# Patient Record
Sex: FEMALE | Race: WHITE | NOT HISPANIC OR LATINO | Employment: FULL TIME | URBAN - METROPOLITAN AREA
[De-identification: names, ages, dates, MRNs, and addresses within clinical notes are randomized per-mention and may not be internally consistent; named-entity substitution may affect disease eponyms.]

---

## 2018-02-28 ENCOUNTER — TRANSFERRED RECORDS (OUTPATIENT)
Dept: MULTI SPECIALTY CLINIC | Facility: CLINIC | Age: 53
End: 2018-02-28

## 2018-02-28 LAB
HPV ABSTRACT: NORMAL
MAMMOGRAM: NORMAL
PAP-ABSTRACT: NORMAL

## 2020-05-05 ENCOUNTER — OFFICE VISIT (OUTPATIENT)
Dept: ORTHOPEDICS | Facility: CLINIC | Age: 55
End: 2020-05-05
Payer: COMMERCIAL

## 2020-05-05 ENCOUNTER — ANCILLARY PROCEDURE (OUTPATIENT)
Dept: GENERAL RADIOLOGY | Facility: CLINIC | Age: 55
End: 2020-05-05
Attending: PEDIATRICS
Payer: COMMERCIAL

## 2020-05-05 ENCOUNTER — TELEPHONE (OUTPATIENT)
Dept: ORTHOPEDICS | Facility: CLINIC | Age: 55
End: 2020-05-05

## 2020-05-05 VITALS
DIASTOLIC BLOOD PRESSURE: 70 MMHG | BODY MASS INDEX: 22.66 KG/M2 | SYSTOLIC BLOOD PRESSURE: 120 MMHG | WEIGHT: 136 LBS | HEIGHT: 65 IN

## 2020-05-05 DIAGNOSIS — S69.92XA INJURY OF LEFT WRIST, INITIAL ENCOUNTER: ICD-10-CM

## 2020-05-05 DIAGNOSIS — S69.92XA INJURY OF LEFT WRIST, INITIAL ENCOUNTER: Primary | ICD-10-CM

## 2020-05-05 PROCEDURE — 99204 OFFICE O/P NEW MOD 45 MIN: CPT | Performed by: PEDIATRICS

## 2020-05-05 PROCEDURE — 73110 X-RAY EXAM OF WRIST: CPT | Mod: LT

## 2020-05-05 ASSESSMENT — MIFFLIN-ST. JEOR: SCORE: 1207.77

## 2020-05-05 NOTE — PATIENT INSTRUCTIONS
Icing, elevation, rest as needed for comfort  Ok to use compression for comfort  Consider use of wrist brace for additional support for comfort or with activities  MRI left wrist next  Will call with MRI results       Advanced imaging is done by appointment. Some insurance require a prior authorization to be completed which may delay the time until you are able to schedule your appointment. Please call [place] [place] [facility] [place]: [phone] to schedule your MRI.  Depending on your availability you can usually schedule within the next 1-2 days.    The clinic will call you with results, if you have not heard from the clinic within 3-4 days following your MRI please contact us at the number listed below.   If you have any further questions for your physician or physician s care team you can call [phone] and use option 3 to leave a voice message. Calls received during business hours will be returned same day.

## 2020-05-05 NOTE — LETTER
5/5/2020         RE: Maida Menendez  [address] [place] [address]        Dear Colleague,    Thank you for referring your patient, Maida Menendez, to the [facility]. Please see a copy of my visit note below.    Sports Medicine Clinic Visit    PCP: Radha Ref-Primary, Physician    Maida Menendez is a 54 year old female who is seen  as a self referral presenting with a left wrist and hand injury.  A metal picture frame fell and hit her on the top of her hand 2-3 weeks ago.  She has continued to have pain with weight bearing, flexion as well as making a full fist.    She is right hand dominant.      Injury: crush injury . Impact over dorsal carpal area.  **  Limited ability to bear weight with wrist in extension. Also has continued limited extension actively.  Dorsal wrist swelling. Has also noted some warmth there.  Still icing, using topical cream.  Has tried some compression support.    Location of Pain: left wrist/hand   Duration of Pain: 2-3 week(s); thinks at least 3 weeks.  Rating of Pain at worst: 9/10  Rating of Pain Currently: 4/10  Symptoms are better with: Ice and Other medications: topical analgesic   Symptoms are worse with: use   Additional Features:   Positive: swelling, bruising and weakness   Negative: popping, grinding, catching, locking, instability, paresthesias, numbness, pain in other joints and systemic symptoms  Other evaluation and/or treatments so far consists of: Ice and bracing   Prior History of related problems: denies     Social History: work in sales for [employer]     Review of Systems  Musculoskeletal: as above  Remainder of review of systems is negative including constitutional, CV, pulmonary, GI, Skin and Neurologic except as noted in HPI or medical history.      PMHx: right frozen shoulder  PSHx: noncontributory for left wrist  Fam hx: noncontributory    Social History     Socioeconomic History     Marital status:      Spouse name: Not  "on file     Number of children: Not on file     Years of education: Not on file     Highest education level: Not on file   Occupational History     Not on file   Social Needs     Financial resource strain: Not on file     Food insecurity     Worry: Not on file     Inability: Not on file     Transportation needs     Medical: Not on file     Non-medical: Not on file   Tobacco Use     Smoking status: Never Smoker     Smokeless tobacco: Never Used   Substance and Sexual Activity     Alcohol use: Not on file     Drug use: Not on file     Sexual activity: Not on file   Lifestyle     Physical activity     Days per week: Not on file     Minutes per session: Not on file     Stress: Not on file   Relationships     Social connections     Talks on phone: Not on file     Gets together: Not on file     Attends [employer] service: Not on file     Active member of club or organization: Not on file     Attends meetings of clubs or organizations: Not on file     Relationship status: Not on file     Intimate partner violence     Fear of current or ex partner: Not on file     Emotionally abused: Not on file     Physically abused: Not on file     Forced sexual activity: Not on file   Other Topics Concern     Not on file   Social History Narrative     Not on file       Objective  /70   Ht 1.651 m (5' 5\")   Wt 61.7 kg (136 lb)   BMI 22.63 kg/m      GENERAL APPEARANCE: healthy, alert and no distress   GAIT: NORMAL  SKIN: no suspicious lesions or rashes  NEURO: Normal strength and tone, mentation intact and speech normal  PSYCH:  mentation appears normal and affect normal/bright  HEENT: no scleral icterus  CV: distal perfusion intact  RESP: nonlabored breathing    Exam:  Hand/wrist (left):    Inspection:  No deformity noted.  Mild diffuse soft tissue swelling over carpals, dorsal wrist. No ecchymosis.    Motion:  Forearm pronation full, forearm supination lacking 10-15 deg.  Wrist flexion lacking 10-15 deg  Wrist extension lacking " >20-30 deg actively; passive also limited   Pain with active and passive  Wrist radial deviation mild limitation  Wrist ulnar deviation mild limitation  Digit motion grossly full, some pain with active extension and full flexion    Sensation:  Grossly intact light touch.    Radial pulses normal, +2/4, capillary refill brisk.    Palpation:  Tender dorsal carpals, snuffbox, SL interval, ulnar fovea    Skin:       well perfused       capillary refill brisk    Scaphoid shift with pain    Radiology:  Visualized radiographs of left wrist obtained today, and reviewed the images with the patient.  Impression: first CMC arthrosis. Mild narrowing radiolunate space. Mild widening scapholunate interval. No clear fracture noted.    Recent Results (from the past 744 hour(s))   XR Wrist Left G/E 3 Views    Narrative    LEFT WRIST THREE OR MORE VIEWS   5/5/2020 1:49 PM     HISTORY: Injury of left wrist, initial encounter.    COMPARISON: None.      Impression    IMPRESSION: Advanced first carpometacarpal degenerative changes.  Otherwise unremarkable. No evidence of acute fracture.    KERRY STOVALL MD             Assessment:  1. Injury of left wrist, initial encounter         Plan:  Discussed the assessment with the patient.  Initially sounds like contusion, though she has limited extension at the wrist; consider occult bony injury, possible SL dissociation.  We discussed the following: symptom treatment, activity modification/rest, imaging, rehab and support for the affected area. Following discussion, plan:  Given ongoing issues at wrist, with persistent swelling, limited wrist motion, ongoing pain, plan MRI next.  See patient instructions.  Wrist brace provided for comfort.  Follow up: call with MRI results.  Questions answered. The patient indicates understanding of these issues and agrees with the plan.    Jesse Alberto, DO, CAQ            Patient Instructions   Icing, elevation, rest as needed for comfort  Ok to use  compression for comfort  Consider use of wrist brace for additional support for comfort or with activities  MRI left wrist next  Will call with MRI results       Advanced imaging is done by appointment. Some insurance require a prior authorization to be completed which may delay the time until you are able to schedule your appointment. Please call [place] [place] [facility] [place]: [phone] to schedule your MRI.  Depending on your availability you can usually schedule within the next 1-2 days.    The clinic will call you with results, if you have not heard from the clinic within 3-4 days following your MRI please contact us at the number listed below.   If you have any further questions for your physician or physician s care team you can call [phone] and use option 3 to leave a voice message. Calls received during business hours will be returned same day.              Disclaimer: This note consists of symbols derived from keyboarding, dictation and/or voice recognition software. As a result, there may be errors in the script that have gone undetected. Please consider this when interpreting information found in this chart.          Again, thank you for allowing me to participate in the care of your patient.        Sincerely,        Jesse Alberto, DO

## 2020-05-05 NOTE — PROGRESS NOTES
Sports Medicine Clinic Visit    PCP: No Ref-Primary, Physician    Maida Menendez is a 54 year old female who is seen  as a self referral presenting with a left wrist and hand injury.  A metal picture frame fell and hit her on the top of her hand 2-3 weeks ago.  She has continued to have pain with weight bearing, flexion as well as making a full fist.    She is right hand dominant.      Injury: crush injury . Impact over dorsal carpal area.  **  Limited ability to bear weight with wrist in extension. Also has continued limited extension actively.  Dorsal wrist swelling. Has also noted some warmth there.  Still icing, using topical cream.  Has tried some compression support.    Location of Pain: left wrist/hand   Duration of Pain: 2-3 week(s); thinks at least 3 weeks.  Rating of Pain at worst: 9/10  Rating of Pain Currently: 4/10  Symptoms are better with: Ice and Other medications: topical analgesic   Symptoms are worse with: use   Additional Features:   Positive: swelling, bruising and weakness   Negative: popping, grinding, catching, locking, instability, paresthesias, numbness, pain in other joints and systemic symptoms  Other evaluation and/or treatments so far consists of: Ice and bracing   Prior History of related problems: denies     Social History: work in sales for [employer]     Review of Systems  Musculoskeletal: as above  Remainder of review of systems is negative including constitutional, CV, pulmonary, GI, Skin and Neurologic except as noted in HPI or medical history.      PMHx: right frozen shoulder  PSHx: noncontributory for left wrist  Fam hx: noncontributory    Social History     Socioeconomic History     Marital status:      Spouse name: Not on file     Number of children: Not on file     Years of education: Not on file     Highest education level: Not on file   Occupational History     Not on file   Social Needs     Financial resource strain: Not on file     Food insecurity     Worry:  "Not on file     Inability: Not on file     Transportation needs     Medical: Not on file     Non-medical: Not on file   Tobacco Use     Smoking status: Never Smoker     Smokeless tobacco: Never Used   Substance and Sexual Activity     Alcohol use: Not on file     Drug use: Not on file     Sexual activity: Not on file   Lifestyle     Physical activity     Days per week: Not on file     Minutes per session: Not on file     Stress: Not on file   Relationships     Social connections     Talks on phone: Not on file     Gets together: Not on file     Attends [employer] service: Not on file     Active member of club or organization: Not on file     Attends meetings of clubs or organizations: Not on file     Relationship status: Not on file     Intimate partner violence     Fear of current or ex partner: Not on file     Emotionally abused: Not on file     Physically abused: Not on file     Forced sexual activity: Not on file   Other Topics Concern     Not on file   Social History Narrative     Not on file       Objective  /70   Ht 1.651 m (5' 5\")   Wt 61.7 kg (136 lb)   BMI 22.63 kg/m      GENERAL APPEARANCE: healthy, alert and no distress   GAIT: NORMAL  SKIN: no suspicious lesions or rashes  NEURO: Normal strength and tone, mentation intact and speech normal  PSYCH:  mentation appears normal and affect normal/bright  HEENT: no scleral icterus  CV: distal perfusion intact  RESP: nonlabored breathing    Exam:  Hand/wrist (left):    Inspection:  No deformity noted.  Mild diffuse soft tissue swelling over carpals, dorsal wrist. No ecchymosis.    Motion:  Forearm pronation full, forearm supination lacking 10-15 deg.  Wrist flexion lacking 10-15 deg  Wrist extension lacking >20-30 deg actively; passive also limited   Pain with active and passive  Wrist radial deviation mild limitation  Wrist ulnar deviation mild limitation  Digit motion grossly full, some pain with active extension and full " flexion    Sensation:  Grossly intact light touch.    Radial pulses normal, +2/4, capillary refill brisk.    Palpation:  Tender dorsal carpals, snuffbox, SL interval, ulnar fovea    Skin:       well perfused       capillary refill brisk    Scaphoid shift with pain    Radiology:  Visualized radiographs of left wrist obtained today, and reviewed the images with the patient.  Impression: first CMC arthrosis. Mild narrowing radiolunate space. Mild widening scapholunate interval. No clear fracture noted.    Recent Results (from the past 744 hour(s))   XR Wrist Left G/E 3 Views    Narrative    LEFT WRIST THREE OR MORE VIEWS   5/5/2020 1:49 PM     HISTORY: Injury of left wrist, initial encounter.    COMPARISON: None.      Impression    IMPRESSION: Advanced first carpometacarpal degenerative changes.  Otherwise unremarkable. No evidence of acute fracture.    KERRY STOVALL MD             Assessment:  1. Injury of left wrist, initial encounter         Plan:  Discussed the assessment with the patient.  Initially sounds like contusion, though she has limited extension at the wrist; consider occult bony injury, possible SL dissociation.  We discussed the following: symptom treatment, activity modification/rest, imaging, rehab and support for the affected area. Following discussion, plan:  Given ongoing issues at wrist, with persistent swelling, limited wrist motion, ongoing pain, plan MRI next.  See patient instructions.  Wrist brace provided for comfort.  Follow up: call with MRI results.  Questions answered. The patient indicates understanding of these issues and agrees with the plan.    Jesse Alberto DO, CAQ            Patient Instructions   Icing, elevation, rest as needed for comfort  Ok to use compression for comfort  Consider use of wrist brace for additional support for comfort or with activities  MRI left wrist next  Will call with MRI results       Advanced imaging is done by appointment. Some insurance require a  prior authorization to be completed which may delay the time until you are able to schedule your appointment. Please call [place] [place] [facility] [place]: [phone] to schedule your MRI.  Depending on your availability you can usually schedule within the next 1-2 days.    The clinic will call you with results, if you have not heard from the clinic within 3-4 days following your MRI please contact us at the number listed below.   If you have any further questions for your physician or physician s care team you can call [phone] and use option 3 to leave a voice message. Calls received during business hours will be returned same day.              Disclaimer: This note consists of symbols derived from keyboarding, dictation and/or voice recognition software. As a result, there may be errors in the script that have gone undetected. Please consider this when interpreting information found in this chart.

## 2020-05-05 NOTE — NURSING NOTE
Patient was fitted for a left wrist brace.  She was able to demonstrate fit and use.  All questions were answered  Yaritza Olivo MS, ATC

## 2020-05-07 ENCOUNTER — ANCILLARY PROCEDURE (OUTPATIENT)
Dept: MRI IMAGING | Facility: CLINIC | Age: 55
End: 2020-05-07
Attending: PEDIATRICS
Payer: COMMERCIAL

## 2020-05-07 DIAGNOSIS — S69.92XA INJURY OF LEFT WRIST, INITIAL ENCOUNTER: ICD-10-CM

## 2020-05-07 PROCEDURE — 73221 MRI JOINT UPR EXTREM W/O DYE: CPT | Mod: TC

## 2020-05-08 ENCOUNTER — TELEPHONE (OUTPATIENT)
Dept: ORTHOPEDICS | Facility: CLINIC | Age: 55
End: 2020-05-08

## 2020-05-08 DIAGNOSIS — S63.8X2D TEAR OF LEFT SCAPHOLUNATE LIGAMENT, SUBSEQUENT ENCOUNTER: ICD-10-CM

## 2020-05-08 DIAGNOSIS — S69.92XA LEFT WRIST INJURY: Primary | ICD-10-CM

## 2020-05-08 NOTE — TELEPHONE ENCOUNTER
Results for orders placed or performed in visit on 05/07/20   MR Wrist Left w/o Contrast    Narrative    MR left wrist without contrast 5/7/2020 2:23 PM    Techniques: Multiplanar multisequence imaging of the left wrist was  obtained without administration of intra-articular or intravenous  contrast using routing protocol.    History: left wrist injury, initial contusion from heavy object,  continued wrist pain and swelling; evaluate scapholunate interval,  possible occult bony injury; Injury of left wrist, initial encounter    Comparison: 5/5/2020    Findings:    Markers placed at the dorsal aspect of the wrist overlying the lunate.    Triangular Fibrocartilage: Suspected tearing of the distal lamina of  the triangular ligament.    Interosseous Ligaments:    Scapholunate ligament: Apparent full thickness tear of the dorsal  component of the scapholunate interosseous ligament. Heterogeneous  appearance of the volar component and membranous component may  degeneratively torn. No associated widening of the scapholunate  interosseous ligament space.  Lunatotriquetral ligament: Not well visualized.    Bones: Multiple areas of marrow edema including the carpal bones,  distal radius, and bases of metacarpals.    Articulations:  Joint effusion: Small to moderate radiocarpal and mid carpal pleural  effusions with internal debris, likely synovitis.    Tendons:  Flexor tendons: Intact. Small amount of palmar bursal fluid.  Extensor tendons: Trace fluid in the second extensor compartment  tendon, nonspecific. Intact.    Miscellaneous soft tissues: No volar or dorsal ganglion cysts.      Impression    IMPRESSION:  1. Suspect complete tear of dorsal component of scapholunate  interosseous ligament and degenerative tear of membranous and volar  components.   2. Extensive marrow edema, joint effusion with internal debris, likely  synovitis. Constellation of findings are most concerning for  underlying inflammatory arthritis.  Alternatively reflecting multiple  bone contusions though extent seems atypical for contusion.  3. Suspected tearing of the distal lamina of the triangular ligament.    MARYBEL REBOLLAR

## 2020-05-08 NOTE — TELEPHONE ENCOUNTER
Called and spoke with patient.  Relayed results.  She would like to see a hand specialist.  Referral placed.  She will continue with ice and bracing to be seen.    Yaritza Olivo MS ATC

## 2020-05-08 NOTE — TELEPHONE ENCOUNTER
Multiple areas of bone marrow edema, that fit with contusion.  There is also fluid in this area, consistent with inflammation from her injury.  The radiologist indicated potential for inflammatory arthritis, though I think this is unlikely given she did have an injury, and it is subacute.  There is also suspected tearing of the scapholunate ligament, as well as possibly the triangular fibrocartilage.  See report for details.  Options: 1) continue with icing, OTC medication, bracing for support, and monitoring; 2) referral to orthopedic hand surgeon.  Anticipate the inflammation and bone bruising will calm down with time.  However, given scapholunate dissociation suspected, and no previous problems with this wrist, now with limited range of motion, would favor referral through orthopedic  for further evaluation and management.  If #1, then contact clinic with update in another ~2 weeks.  Thanks.  Jesse Alberto DO, MARILYN

## 2020-05-11 ENCOUNTER — TELEPHONE (OUTPATIENT)
Dept: ORTHOPEDICS | Facility: CLINIC | Age: 55
End: 2020-05-11

## 2020-05-11 NOTE — TELEPHONE ENCOUNTER
[facility] Call Center    Phone Message    May a detailed message be left on voicemail: yes     Reason for Call: Other: pt calking because she has a referral to be seen for a Lft wrist injury. Pt would like to be seen in clinic. Please call the pt back to discuss. Thank You.      Action Taken: Message routed to:  [facility] orthopedics    Travel Screening: Not Applicable

## 2020-05-12 ENCOUNTER — ANCILLARY PROCEDURE (OUTPATIENT)
Dept: GENERAL RADIOLOGY | Facility: CLINIC | Age: 55
End: 2020-05-12
Attending: ORTHOPAEDIC SURGERY
Payer: COMMERCIAL

## 2020-05-12 DIAGNOSIS — S63.8X2A LEFT SCAPHOLUNATE LIGAMENT TEAR: ICD-10-CM

## 2020-05-12 DIAGNOSIS — S63.8X2A LEFT SCAPHOLUNATE LIGAMENT TEAR: Primary | ICD-10-CM

## 2020-05-12 PROCEDURE — 73100 X-RAY EXAM OF WRIST: CPT | Mod: 52

## 2020-05-12 NOTE — TELEPHONE ENCOUNTER
Called patient and scheduled a virtual visit tomorrow with Dr. Arrieta. Patient will go to [facility] location today to get a pencil  stress view xray. Patient was agreeable to plan and will call back with any further questions or concerns.

## 2020-05-13 ENCOUNTER — VIRTUAL VISIT (OUTPATIENT)
Dept: ORTHOPEDICS | Facility: CLINIC | Age: 55
End: 2020-05-13
Payer: COMMERCIAL

## 2020-05-13 ENCOUNTER — DOCUMENTATION ONLY (OUTPATIENT)
Dept: CARE COORDINATION | Facility: CLINIC | Age: 55
End: 2020-05-13

## 2020-05-13 DIAGNOSIS — M25.532 LEFT WRIST PAIN: Primary | ICD-10-CM

## 2020-05-13 NOTE — PROGRESS NOTES
"Maida Menendez is a 54 year old female who is being evaluated via a billable video visit.      The patient has been notified of following:     \"This video visit will be conducted via a call between you and your physician/provider. We have found that certain health care needs can be provided without the need for an in-person physical exam.  This service lets us provide the care you need with a video conversation.  If a prescription is necessary we can send it directly to your pharmacy.  If lab work is needed we can place an order for that and you can then stop by our lab to have the test done at a later time.    Video visits are billed at different rates depending on your insurance coverage.  Please reach out to your insurance provider with any questions.    If during the course of the call the physician/provider feels a video visit is not appropriate, you will not be charged for this service.\"    Patient has given verbal consent for Video visit? Yes    How would you like to obtain your AVS? Mail a copy    Patient would like the video invitation sent by: Send to e-mail at: @[employer]    Will anyone else be joining your video visit? No        Video-Visit Details    Type of service:  Video VisitOriginating Location (pt. Location): Home    Distant Location (provider location):  [facility]     Platform used for Video Visit: Jaky Arrieta MD        "

## 2020-05-13 NOTE — PROGRESS NOTES
Date of Service: May 13, 2020    Chief Complaint:   Chief Complaint   Patient presents with     Consult     Left wrist scapholunate ligament tear       History of Present Illness: Maida Menendez is a 54 year old female who works in sales for [employer] presents today for further evaluation of a left wrist injury. The patient sustained the injury perhaps 3-4 weeks ago after a metal picture frame fell and hit the top of her hand while she was at her desk.  Prior to that picture frame falling, she notes she did slam the hand on a desk.  The patient was initially seen by Dr. Alberto who provided a wrist brace and recommended an MRI. She was subsequently referred to me.     She notes that the pain never fully went away after the intial injury. She has also noted pain waking her up at night,  pain holding a phone, putting weight on it or bending it during yoga. Pain is in the dorsal hand and wrist and in the area of the distal ulna.    However it was never really clear if the pain was from sleeping on it funny or something else- she actually forgot about the episode where the frame hit the hand until directly asked about any injury. She wondered if it was arthritis or carpal tunnel.     She has iced, rested, and put on a store bought brace. It has looked swollen and red, puffy dorsally. She notes that the symptosm flare up from time to time. It feels better with rest and ice.     There is no numbness/tingling. There are no difficulties moving the fingers.  No fevers or chills.    Review of Systems: A 14-point review of systems was obtained on intake and reviewed.     Past Medical History:   Diagnosis Date     Allergies      Hyperlipidemia        Past Surgical History:   Procedure Laterality Date     ENDOSCOPIC STRIPPING VEIN(S)  2015, 2017     SINUS SURGERY  2000, 2014         Current Outpatient Medications:      order for DME, Equipment being ordered: wrist brace, left, Disp: 1 Device, Rfl: 0    Allergies   Allergen  Reactions     Doxycycline      Sulfa Drugs        Social History     Tobacco Use     Smoking status: Never Smoker     Smokeless tobacco: Never Used   Substance Use Topics     Alcohol use: Not on file     Drug use: Not on file        Family History   Problem Relation Age of Onset     Coronary Artery Disease Mother      Hyperlipidemia Mother      Cerebrovascular Disease Mother      Hypertension Father      Cerebrovascular Disease Father      Coronary Artery Disease Paternal Grandfather        Physical examination:  Well-developed, well-nourished and in no acute distress.  Alert and oriented to surroundings. Cooperative with exam. Respirations unlabored.     left upper extremity:   There is faint dorsal wrist and hand swelling. AROM WE 30, WF 70. Sore but not painful. Full pronation and supnation. No pain with gentle passive wrist ROM. No erythema or wounds. Can make full composite fist and straighten fingers out fully. Sensation intact in median, radial and ulnar nerve distributions.     Radiographs: 3 views obtained today of the left wrist demonstrate no remarkable findings. Stress  views show SL interval bilaterally upper limit normal    MRI: 1. Suspect complete tear of dorsal component of scapholunate  interosseous ligament and degenerative tear of membranous and volar  components.   2. Extensive marrow edema, joint effusion with internal debris, likely  synovitis. Constellation of findings are most concerning for  underlying inflammatory arthritis. Alternatively reflecting multiple  bone contusions though extent seems atypical for contusion.  3. Suspected tearing of the distal lamina of the triangular ligament.      Assessment: Left wrist pain. MRI with SL ligament tearing, also marrow edema, joint effusion with debris, bony edema.  No static instability on radiographics, pencil  x-rays with bilateral SL interval upper limit of normal.  Overall I am most suspicious for inflammatory etiology.    Plan:    I  had a long discussion with Maida.  Overall, I do not think the mechanism of injury or clinical picture here is consistent with an acute scapholunate ligament injury.  Lack of substantial static or dynamic instability also suggests that if there is one, it would be unlikely to require acute repair.  She does have a mildly elevated capitolunate angle on her MRI sagittal views but there are no other signs of DISI deformity and this may very well be chronic.     At this point I am more concerned for an inflammatory arthritis.  Based on her time course, stable to improving symptoms and minimal pain with passive motion, I think an infectious arthritis is unlikely.  I discussed with her that pseudogout is certainly on my differential.  As she does feel things are getting slightly better, I would like her to continue to wear her wrist brace and begin to take Aleve 2 times a day for the next 2 weeks.  I would like to see her back in clinic in person in 2 weeks.  At that point based on her progress, will discuss whether it would be appropriate proceed with wrist aspiration and possible steroid injection.  She was agreeable to this and will notify me if any substantial worsening in the interim.    Total time: 30 min  End time 10:52

## 2020-05-28 DIAGNOSIS — M25.532 LEFT WRIST PAIN: ICD-10-CM

## 2020-05-28 LAB
CRP SERPL-MCNC: 7 MG/L (ref 0–8)
ERYTHROCYTE [DISTWIDTH] IN BLOOD BY AUTOMATED COUNT: 13.5 % (ref 10–15)
ERYTHROCYTE [SEDIMENTATION RATE] IN BLOOD BY WESTERGREN METHOD: 45 MM/H (ref 0–30)
HCT VFR BLD AUTO: 40.8 % (ref 35–47)
HGB BLD-MCNC: 12.6 G/DL (ref 11.7–15.7)
MCH RBC QN AUTO: 30 PG (ref 26.5–33)
MCHC RBC AUTO-ENTMCNC: 30.9 G/DL (ref 31.5–36.5)
MCV RBC AUTO: 97 FL (ref 78–100)
PLATELET # BLD AUTO: 255 10E9/L (ref 150–450)
RBC # BLD AUTO: 4.2 10E12/L (ref 3.8–5.2)
URATE SERPL-MCNC: 4.7 MG/DL (ref 2.6–6)
WBC # BLD AUTO: 8.9 10E9/L (ref 4–11)

## 2020-05-29 LAB
ANA PAT SER IF-IMP: ABNORMAL
ANA SER QL IF: POSITIVE
ANA TITR SER IF: ABNORMAL {TITER}
RHEUMATOID FACT SER NEPH-ACNC: <7 IU/ML (ref 0–20)

## 2020-05-30 LAB — CCP AB SER IA-ACNC: 1 U/ML

## 2020-06-01 ENCOUNTER — OFFICE VISIT (OUTPATIENT)
Dept: ORTHOPEDICS | Facility: CLINIC | Age: 55
End: 2020-06-01
Payer: COMMERCIAL

## 2020-06-01 DIAGNOSIS — M25.532 LEFT WRIST PAIN: Primary | ICD-10-CM

## 2020-06-01 NOTE — NURSING NOTE
Reason For Visit:   Chief Complaint   Patient presents with     RECHECK     left wrist scapholunate ligament tear      Primary MD: No Ref-Primary, Physician    Age: 54 year old    ?  No    There were no vitals taken for this visit.    Pain Assessment  Patient Currently in Pain: Yes  0-10 Pain Scale: 7  Primary Pain Location: Wrist    QuickDASH Assessment  No flowsheet data found.     Current Outpatient Medications   Medication Sig Dispense Refill     order for DME Equipment being ordered: wrist brace, left 1 Device 0     Allergies   Allergen Reactions     Doxycycline      Sulfa Drugs      Chandni ESTES

## 2020-06-01 NOTE — LETTER
Date:June 5, 2020      Patient was self referred, no letter generated. Do not send.        [facility] Information

## 2020-06-01 NOTE — PROGRESS NOTES
"Date of Service: Jun 1, 2020    Chief Complaint: Follow-up left wrist pain    History of Present Illness: Maida Menendez comes to see me in follow-up today. Since last visit she feels the swelling is much better although she still has some residual swelling over the dorsal hand. The redness has resolved. She still cant extend the wrist or pt weight on it. She feels it locks sometimes and she can't use it until it cracks, and then it feels better. The pain radiates up dorsal hand into fingers. Also some radial wrist pain and pain in the thumb. She has some similar pain in left thumb base. She notes raynaud's syndrome. She was noted to have a \"positive antibody\" many years ago. She was seen by a rheumatologist and given nmedications but they made her sick so she stopped taking them.       Physical examination:  Well-developed, well-nourished and in no acute distress.  Alert and oriented to surroundings. Extra-ocular motions intact. Cooperative with exam. Respirations unlabored.     Examination of the left upper extremity reveals skin to be clean, dry and intact. There are no wounds. Swelling is present over dorsal wrist distal to extensor retinaculum. . Deformity is absent. The patient is able to extend the fingers fully. The patient is able to make a full composite fist with tip to palm distance of (not applicable) finger-widths.     Areas of tenderness are as follows:   diffusely around wrist dorsally most pronounced at SL interval and also thumb CMC joint. Positive thumb CMC grind.       Observed active range of motion is as follows:   Wrist extension: 10 degrees  Wrist flexion: 40  degrees  Pronation: full degrees  Supination: full  degrees    Sensation to light touch is intact as follows:  median, radial and ulnar    Fingers appear well-perfused with good capillary refill    Labs: Positive DANIELLE mildly elevated ESR    Assessment: 54 year old female with left dorsal wrist pain. This started shortly after she slammed " her hand on the desk and a picture fell on the back of her wrist. Imaging has shown SL ligament deficiency as well as findings concerning for possible underlying inflammatory process. I discussed with her that her clinical picture is more consistent with a inflammatory arthritis than with an acute scapholunate ligament injury. Injury mechanism would also be quite atypical for Acute SL injury. 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 DANIELLE was positive, ESR mildly positive.     Plan:   I would like her to be seen by rheumatology to see if an underlying inflammatory condition may be contributing to this. I did suggest a steroid injection today but she was not interested in this. A steroid taper might also be a good option but I would like her to be evaluated by rheumatology first to get their thoughts. I will also have her see hand therapy for splinting and exercise program for her CMC arthritis.   I would like to see the patient back in 6 weeks for re-evaluation.

## 2020-06-01 NOTE — LETTER
"    6/1/2020         RE: Maida Menendez  [id number] [place] [address]        Dear Colleague,    Thank you for referring your patient, Maida Menendez, to the [facility]. Please see a copy of my visit note below.    Date of Service: Jun 1, 2020    Chief Complaint: Follow-up left wrist pain    History of Present Illness: Maida Menendez comes to see me in follow-up today. Since last visit she feels the swelling is much better although she still has some residual swelling over the dorsal hand. The redness has resolved. She still cant extend the wrist or pt weight on it. She feels it locks sometimes and she can't use it until it cracks, and then it feels better. The pain radiates up dorsal hand into fingers. Also some radial wrist pain and pain in the thumb. She has some similar pain in left thumb base. She notes raynaud's syndrome. She was noted to have a \"positive antibody\" many years ago. She was seen by a rheumatologist and given nmedications but they made her sick so she stopped taking them.       Physical examination:  Well-developed, well-nourished and in no acute distress.  Alert and oriented to surroundings. Extra-ocular motions intact. Cooperative with exam. Respirations unlabored.     Examination of the left upper extremity reveals skin to be clean, dry and intact. There are no wounds. Swelling is present over dorsal wrist distal to extensor retinaculum. . Deformity is absent. The patient is able to extend the fingers fully. The patient is able to make a full composite fist with tip to palm distance of (not applicable) finger-widths.     Areas of tenderness are as follows:   diffusely around wrist dorsally most pronounced at SL interval and also thumb CMC joint. Positive thumb CMC grind.       Observed active range of motion is as follows:   Wrist extension: 10 degrees  Wrist flexion: 40  degrees  Pronation: full degrees  Supination: full  degrees    Sensation to light touch is intact as " follows:  median, radial and ulnar    Fingers appear well-perfused with good capillary refill    Labs: Positive DANIELLE mildly elevated ESR    Assessment: 54 year old female with left dorsal wrist pain. This started shortly after she slammed her hand on the desk and a picture fell on the back of her wrist. Imaging has shown SL ligament deficiency as well as findings concerning for possible underlying inflammatory process. I discussed with her that her clinical picture is more consistent with a inflammatory arthritis than with an acute scapholunate ligament injury. Injury mechanism would also be quite atypical for Acute SL injury. 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 DANIELLE was positive, ESR mildly positive.     Plan:   I would like her to be seen by rheumatology to see if an underlying inflammatory condition may be contributing to this. I did suggest a steroid injection today but she was not interested in this. A steroid taper might also be a good option but I would like her to be evaluated by rheumatology first to get their thoughts. I will also have her see hand therapy for splinting and exercise program for her CMC arthritis.   I would like to see the patient back in 6 weeks for re-evaluation.              Again, thank you for allowing me to participate in the care of your patient.        Sincerely,        Jay Arrieta MD

## 2020-06-03 ENCOUNTER — TELEPHONE (OUTPATIENT)
Dept: RHEUMATOLOGY | Facility: CLINIC | Age: 55
End: 2020-06-03

## 2020-06-03 NOTE — TELEPHONE ENCOUNTER
"Patient is referred by Jay Arrieta for elevated inflammatory markers and left wrist pain.     Patient complains of pain, redness and swelling in her hand and stated that she could not put weight on it. Patient describes the pain as radiating up her hand into her fingers. Patient was seen by a Rheumatologist many years ago, but never followed up with care.     Per referring upon exam:    \"Examination of the left upper extremity reveals skin to be clean, dry and intact. There are no wounds. Swelling is present over dorsal wrist distal to extensor retinaculum. . Deformity is absent. The patient is able to extend the fingers fully. The patient is able to make a full composite fist with tip to palm distance of (not applicable) finger-widths.     Areas of tenderness are as follows:   diffusely around wrist dorsally most pronounced at SL interval and also thumb CMC joint. Positive thumb CMC grind. \"    \"I discussed with her that her clinical picture is more consistent with a inflammatory arthritis than with an acute scapholunate ligament injury. Injury mechanism would also be quite atypical for Acute SL injury. 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 DANIELLE was positive, ESR mildly positive. \"    Component      Latest Ref Rng & Units 5/28/2020   DANIELLE interpretation      NEG:Negative Positive (A)   DANIELLE pattern 1       CENTROMERE   DANIELLE titer 1       1:1,280   Uric Acid      2.6 - 6.0 mg/dL 4.7   Rheumatoid Factor      <12 IU/mL <7   Sed Rate      0 - 30 mm/h 45 (H)   Cyclic Citrullinated Peptide Antibody, IgG      <7 U/mL 1   CRP Inflammation      0.0 - 8.0 mg/L 7.0     Sending to clinic coordinators to offer patient an appointment.     Loan Bhatt CMA   6/3/2020 2:18 PM     "

## 2020-06-04 NOTE — TELEPHONE ENCOUNTER
Health Call Center    Phone Message    May a detailed message be left on voicemail: yes     Reason for Call: pt reporting that Dr. Jay Arrieta referring her to Rheumatology. Pt is waiting for a call from our clinic to schedule an appt. Writer can see Loan's notes from 6/3/20. Writer did not know, for sure, which provider to schedule pt with. Please have clinic coordinators for Rheumatology call this pt back to schedule per Loan's note. Pt can be reached by her cell ph#. If for any reason she does not  the call, please call her ph# [phone] (at the cabin) as sometimes the cell phone does not have good coverage at the cabin. Than you.    Action Taken: Message routed to:  [facility]  Rheumatology    Travel Screening: Not Applicable

## 2020-06-05 NOTE — TELEPHONE ENCOUNTER
Pt can be scheduled in fellow's clinic with Dr. Ngo on 6/10/2020 at 8 am for a virtual visit.      Left message requesting pt return call to me directly.    Day Arredondo RN  Rheumatology Clinic

## 2020-06-08 ENCOUNTER — TELEPHONE (OUTPATIENT)
Dept: ORTHOPEDICS | Facility: CLINIC | Age: 55
End: 2020-06-08

## 2020-06-08 NOTE — TELEPHONE ENCOUNTER
Health Call Center    Phone Message    May a detailed message be left on voicemail: yes     Reason for Call: Other: pt calling to speak with someone because she has questions on what all the imaging showed when she had it done. The pt wants to know does it show the left side of her fist that sticks out under the bone. Pt asking due to swelling does the Dr think that the pt could possibly have carpal tunnel. Please call the pt back to discuss. Thank You.      Action Taken: Message routed to:  [facility] orthopedics    Travel Screening: Not Applicable

## 2020-06-08 NOTE — TELEPHONE ENCOUNTER
Pt returned call to clinic     She went to rheumatology first 30-35 years ago and then again 16 years ago, Dr. Hansen at [facility].  She will fill out request for ANJANA, and we will try to get records before appt if there are any.      Has been offered and appt in fellows clinic on 6/10/2020 and she has accepted.    Day Arredondo RN  Rheumatology Clinic

## 2020-06-10 ENCOUNTER — VIRTUAL VISIT (OUTPATIENT)
Dept: RHEUMATOLOGY | Facility: CLINIC | Age: 55
End: 2020-06-10
Attending: INTERNAL MEDICINE
Payer: COMMERCIAL

## 2020-06-10 DIAGNOSIS — M25.532 LEFT WRIST PAIN: ICD-10-CM

## 2020-06-10 DIAGNOSIS — M34.9 SYSTEMIC SCLEROSIS WITH LIMITED CUTANEOUS INVOLVEMENT (H): ICD-10-CM

## 2020-06-10 DIAGNOSIS — M34.1 CREST SYNDROME (H): Primary | ICD-10-CM

## 2020-06-10 RX ORDER — COVID-19 ANTIGEN TEST
220 KIT MISCELLANEOUS 2 TIMES DAILY WITH MEALS
COMMUNITY
End: 2022-02-10

## 2020-06-10 ASSESSMENT — PAIN SCALES - GENERAL: PAINLEVEL: SEVERE PAIN (6)

## 2020-06-10 NOTE — PROGRESS NOTES
Maida Menendez is a 54 year old female who is being evaluated via a billable video visit. Details of video visit documentation can be found at the end of this note.  ==================================================  CC: swelling of hand and wrist    HPI: 55 yo F with ongoing left wrist weakness and pain after picture fell on her hand back in April. She has redness and swelling of dorsal left wrist and ultimately had an MRI in May that showed a full thickness tear of the dorsal component of the scapholunate interosseous ligament and suspected tearing of the distal lamina of the triangular cartilage. MRI was also notable for bone edema per radiologist. She was then seen by hand specialist, Dr. Arrieta, who felt that these tears are not consistent with acute injury and more so long term injury and is concerned about evidence of synovitis noted on imaging.     Maida notes that she has had improvement in pain over time.  At this point she has no pain with most movements and no longer drops items by accident.  However she still cannot put any weight on her dorsiflexed wrist which is something she would like to be able to do to continue yoga. This is the only time she feels pain in her hand. She also notes some improvement in overnight pain and does not have any general morning stiffness.    Maida was followed many many years ago by a rheumatologist for her very positive DANIELLE which has a centromeric pattern.  She does endorse raynaud's and at the time she was started on a blood pressure medication for her ray nodes which caused her to have some side effects and which she discontinued.  She has a strong preference to not take medications unless they are going to cure her or truly mitigate her symptoms and she felt that the risks and benefits of the blood pressure medication at the time were not for her.  She also notes that she has had notable devyn-ungual capillary dropout to the point that she is able to see little red dots  "at her cuticles.  She does not have pain with Raynaud's episodes, and she is never had a digital ulcer.  Raynaud's episodes occur more commonly in her hands, particularly at temperatures less than 55  F and when she is feeling very anxious or stressed, and she is never noticed one happening in her toes.    She has never had oral ulcers, dyspnea, she denies telangiectasias, does not have difficulty swallowing any items, she did crack her jaw many years ago but does not feel that the opening to her mouth feels tight, she denies dry eye, denies dry mouth, has not had increased frequency of cavities, she does not take steroids for any condition, and she has not yet had a steroid injection of her wrist. The skin of her fingertip does not feel tight or smooth and she has no difficulty or increased awareness of her skin when she makes a tight fist. She has no intractable or recurrent skin itching.    ROS: 10 point ROS completed and negative except as noted per HPI    ALLERGIES: doxycycline, sulfa drugs     MEDS: naproxen (short course prescribed for wrist pain), varicose vein stripping, sinus surgery, hysterectomy    PM/SHx: allergies, HLD    FAMHx: father had PMR that cause bilateral hand pain, both parents had cardiovascular disease    PHYSICAL EXAM:  No vitals taken today. Last full vitals from care everywhere:  Blood Pressure 96/63 12/19/2019 1:51 PM CST     Pulse 67 12/19/2019 1:51 PM CST     Temperature 37.1  C (98.7  F) 12/19/2019 1:51 PM CST     Respiratory Rate 12 01/23/2017 10:21 AM CST     Oxygen Saturation 95% 12/19/2019 1:51 PM CST     Inhaled Oxygen Concentration - -     Weight 60.6 kg (133 lb 9.6 oz) 12/19/2019 1:51 PM CST     Height 164.3 cm (5' 4.69\") 12/19/2019 1:51 PM CST     Body Mass Index 22.45 12/19/2019 1:51 PM CST      General: NAD, pleasant  HEENT: face symmetric, no telangiectasias, even facial expression, no obvious restriction of oral aperture  Resp: able to complete long sentences without " dyspnea or pause  Skin: no rashes of face, upper arms, neck, chest  MSK: pain with dorsiflexion of left wrist and limited dorsiflexion to ~75 degrees, full extension of wrist to 180 degrees, no swelling or redness of either wrist visible, supination and pronation intact bilaterally, no loss of valleys between MCPs, able to make full tight fists bilaterally  Psych: congruent mood and affect    MR left wrist without contrast 5/7/2020 2:23 PM  Findings:  Markers placed at the dorsal aspect of the wrist overlying the lunate.    Triangular Fibrocartilage:   Suspected tearing of the distal lamina of the triangular ligament.     Interosseous Ligaments:    Scapholunate ligament: Apparent full thickness tear of the dorsal  component of the scapholunate interosseous ligament. Heterogeneous  appearance of the volar component and membranous component may  degeneratively torn. No associated widening of the scapholunate  interosseous ligament space.  Lunatotriquetral ligament: Not well visualized.     Bones: Multiple areas of marrow edema including the carpal bones, distal radius, and bases of metacarpals.     Articulations:  Joint effusion: Small to moderate radiocarpal and mid carpal pleural effusions with internal debris, likely synovitis.     Tendons:  Flexor tendons: Intact. Small amount of palmar bursal fluid.  Extensor tendons: Trace fluid in the second extensor compartment  tendon, nonspecific. Intact.     Miscellaneous soft tissues: No volar or dorsal ganglion cysts.                                                                   IMPRESSION:  1. Suspect complete tear of dorsal component of scapholunate interosseous ligament and degenerative tear of membranous and volar  components.   2. Extensive marrow edema, joint effusion with internal debris, likely synovitis. Constellation of findings are most concerning for  underlying inflammatory arthritis. Alternatively reflecting multiple bone contusions though extent seems  atypical for contusion.  3. Suspected tearing of the distal lamina of the triangular ligament.    RHEUM RESULTS Latest Ref Rng & Units 5/28/2020   SED RATE 0 - 30 mm/h 45(H)   CRP, INFLAMMATION 0.0 - 8.0 mg/L 7.0   RHEUMATOID FACTOR <12 IU/mL <7   WBC 4.0 - 11.0 10e9/L 8.9   RBC 3.8 - 5.2 10e12/L 4.20   HGB 11.7 - 15.7 g/dL 12.6   HCT 35.0 - 47.0 % 40.8   MCV 78 - 100 fl 97   MCHC 31.5 - 36.5 g/dL 30.9(L)   RDW 10.0 - 15.0 % 13.5    - 450 10e9/L 255     Lab Results   Component Value Date/Time    JEFF Positive (A) 05/28/2020 12:44 PM    ANAP1 CENTROMERE 05/28/2020 12:44 PM    ANAT1 1:1,280 05/28/2020 12:44 PM    RHF <7 05/28/2020 12:44 PM    CCPIGG 1 05/28/2020 12:44 PM     ASSESSMENT:   53 yo F with 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    ## wrist swelling, tendon injury  Her MRI results are concerning but do not correspond to clinical picture-- for example, she denies MCP pain although devyn-MCP bone marrow edema is noted. Stand alone synovitis, particularly unilateral, would be atypical for CREST. However, as the disease can involve tendons and ligaments, it is still possible that following trauma her systemic disease has slowed down or altered her course of healing secondary to trauma-induce inflammation. In addition, I would like to screen her for evidence of overlap syndromes that could more directly explain her synnovitis.    Alternatively, pseudgout can be triggered by trauma and an aspiration closer to her initial presentation to evaluate for CPPD crystals could have been very helpful. At this time she has minimal swelling visible and is unlikely to have much remaining to aspirate. The treatment for this, given focal involvement, would be steroid joint injection.    Overall, Maida has a strong aversion to starting any medications, getting any  "surgeries, or getting any injections that are not necessary. We did discuss that sometimes a steroid injection can be instructive and that a steroid injection prior to hand therapy may improve outcomes with hand therapy. She would prefer to try hand therapy and if her recovery seems to stall would then consider a steroid injection.    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 However, pulmonary artery hypertension can be a late manifestation of the disease and rather insidious at onset. Therapy for scleroderma at this stage is mainly supportive and Maida has no need for supportive therapies as this time. Nonetheless, she will require monitoring long term. We will need a baseline echo, pulmonary function test, and 6MW but, given COVID restrictions, will delay this until next visit in 6 months at which point hopefully our systems are more readily available for in person visits.    PLAN:  1. Referral to hand therapy. If she cannot make appropriate  2. Complete phenotyping workup for systemic sclerosis, rule out overlap syndrome: TRACEY, centromere, RNP, and SCL-70 antibodies  3. RTC in 6 months for echo, PFT, 6MW    Staffed with Dr. Juan José Ngo MD, PhD  Rheumatology Fellow  Pager [phone]  ==================================================  The patient has been notified of following:     \"This video visit will be conducted via a call between you and your physician/provider. We have found that certain health care needs can be provided without the need for an in-person physical exam.  This service lets us provide the care you need with a video conversation.  If a prescription is necessary we can send it directly to your pharmacy.  If lab work is needed we can place an order for that and you can then stop by our lab to have the test done at a later " "time.    Video visits are billed at different rates depending on your insurance coverage.  Please reach out to your insurance provider with any questions.    If during the course of the call the physician/provider feels a video visit is not appropriate, you will not be charged for this service.\"    Patient has given verbal consent for Video visit? Yes    How would you like to obtain your AVS? Molina    Patient would like the video invitation sent by: Send to e-mail at: [email or web address] or [email or web address]    Will anyone else be joining your video visit? No        Video-Visit Details    Type of service:  Video Visit    Video Start Time: 8:07  Video End Time: 9:20    Originating Location (pt. Location): Home    Distant Location (provider location):  [facility] RHEUMATOLOGY     Platform used for Video Visit: Pam Ngo MD, PhD    I saw the patient on this video visit with the fellow.  My exam and recomendations are as described.      Juan José Ospina MD          "

## 2020-06-10 NOTE — Clinical Note
"6/10/2020       RE: Maida Menendez  [address] [facility] [address]     Dear Colleague,    Thank you for referring your patient, Maida Menendez, to the [facility] RHEUMATOLOGY at [facility]. Please see a copy of my visit note below.    Maida Menendez is a 54 year old female who is being evaluated via a billable video visit.      The patient has been notified of following:     \"This video visit will be conducted via a call between you and your physician/provider. We have found that certain health care needs can be provided without the need for an in-person physical exam.  This service lets us provide the care you need with a video conversation.  If a prescription is necessary we can send it directly to your pharmacy.  If lab work is needed we can place an order for that and you can then stop by our lab to have the test done at a later time.    Video visits are billed at different rates depending on your insurance coverage.  Please reach out to your insurance provider with any questions.    If during the course of the call the physician/provider feels a video visit is not appropriate, you will not be charged for this service.\"    Patient has given verbal consent for Video visit? Yes    How would you like to obtain your AVS? MyChart    Patient would like the video invitation sent by: Send to e-mail at: [email or web address]    Will anyone else be joining your video visit? No  {If patient encounters technical issues they should call [phone] :[phone]}      Video-Visit Details    Type of service:  Video Visit    Video Start Time: {video visit start/end time:152948}  Video End Time: {video visit start/end time:152948}    Originating Location (pt. Location): {video visit patient location:320150::\"Home\"}    Distant Location (provider location):  [facility] RHEUMATOLOGY     Platform used for Video Visit: {Virtual Visit Platforms:955520::\"[facility]\"}    {signature " options:218338}          Again, thank you for allowing me to participate in the care of your patient.      Sincerely,    Ramin Ngo MD

## 2020-06-10 NOTE — PATIENT INSTRUCTIONS
"You do have limited systemic sclerosis also known as CREST. I will check you for additional proteins to evaluate for an \"overlap arthritis\". CREST by itself can make your tendons more susceptible to injury and there may be a component of inflammation dragging out your healing process.    Please stop by a [facility] in the next 1-2 weeks.    As we discussed, I have referred you to hand therapy. If you or your therapist feels your therapy is not going as well as one would hope, I suggest that we try a steroid injection at your wrist and see if that helps.    Let's see each other in 6 months at which time we will get baseline pulmonary function testing and an echo for screening.    Dr. LAYNE"

## 2020-06-10 NOTE — LETTER
6/10/2020      RE: Maida Menendez  [address] [facility] [place] [address]       Maida Menendez is a 54 year old female who is being evaluated via a billable video visit. Details of video visit documentation can be found at the end of this note.  ==================================================  CC: swelling of hand and wrist    HPI: 53 yo F with ongoing left wrist weakness and pain after picture fell on her hand back in April. She has redness and swelling of dorsal left wrist and ultimately had an MRI in May that showed a full thickness tear of the dorsal component of the scapholunate interosseous ligament and suspected tearing of the distal lamina of the triangular cartilage. MRI was also notable for bone edema per radiologist. She was then seen by hand specialist, Dr. Arrieta, who felt that these tears are not consistent with acute injury and more so long term injury and is concerned about evidence of synovitis noted on imaging.     Maida notes that she has had improvement in pain over time.  At this point she has no pain with most movements and no longer drops items by accident.  However she still cannot put any weight on her dorsiflexed wrist which is something she would like to be able to do to continue yoga. This is the only time she feels pain in her hand. She also notes some improvement in overnight pain and does not have any general morning stiffness.    Maida was followed many many years ago by a rheumatologist for her very positive DANIELLE which has a centromeric pattern.  She does endorse raynaud's and at the time she was started on a blood pressure medication for her ray nodes which caused her to have some side effects and which she discontinued.  She has a strong preference to not take medications unless they are going to cure her or truly mitigate her symptoms and she felt that the risks and benefits of the blood pressure medication at the time were not for her.  She also notes that she has had notable  "devyn-ungual capillary dropout to the point that she is able to see little red dots at her cuticles.  She does not have pain with Raynaud's episodes, and she is never had a digital ulcer.  Raynaud's episodes occur more commonly in her hands, particularly at temperatures less than 55  F and when she is feeling very anxious or stressed, and she is never noticed one happening in her toes.    She has never had oral ulcers, dyspnea, she denies telangiectasias, does not have difficulty swallowing any items, she did crack her jaw many years ago but does not feel that the opening to her mouth feels tight, she denies dry eye, denies dry mouth, has not had increased frequency of cavities, she does not take steroids for any condition, and she has not yet had a steroid injection of her wrist. The skin of her fingertip does not feel tight or smooth and she has no difficulty or increased awareness of her skin when she makes a tight fist. She has no intractable or recurrent skin itching.    ROS: 10 point ROS completed and negative except as noted per HPI    ALLERGIES: doxycycline, sulfa drugs     MEDS: naproxen (short course prescribed for wrist pain), varicose vein stripping, sinus surgery, hysterectomy    PM/SHx: allergies, HLD    FAMHx: father had PMR that cause bilateral hand pain, both parents had cardiovascular disease    PHYSICAL EXAM:  No vitals taken today. Last full vitals from care everywhere:  Blood Pressure 96/63 12/19/2019 1:51 PM CST     Pulse 67 12/19/2019 1:51 PM CST     Temperature 37.1  C (98.7  F) 12/19/2019 1:51 PM CST     Respiratory Rate 12 01/23/2017 10:21 AM CST     Oxygen Saturation 95% 12/19/2019 1:51 PM CST     Inhaled Oxygen Concentration - -     Weight 60.6 kg (133 lb 9.6 oz) 12/19/2019 1:51 PM CST     Height 164.3 cm (5' 4.69\") 12/19/2019 1:51 PM CST     Body Mass Index 22.45 12/19/2019 1:51 PM CST      General: NAD, pleasant  HEENT: face symmetric, no telangiectasias, even facial expression, no " obvious restriction of oral aperture  Resp: able to complete long sentences without dyspnea or pause  Skin: no rashes of face, upper arms, neck, chest  MSK: pain with dorsiflexion of left wrist and limited dorsiflexion to ~75 degrees, full extension of wrist to 180 degrees, no swelling or redness of either wrist visible, supination and pronation intact bilaterally, no loss of valleys between MCPs, able to make full tight fists bilaterally  Psych: congruent mood and affect    MR left wrist without contrast 5/7/2020 2:23 PM  Findings:  Markers placed at the dorsal aspect of the wrist overlying the lunate.    Triangular Fibrocartilage:   Suspected tearing of the distal lamina of the triangular ligament.     Interosseous Ligaments:    Scapholunate ligament: Apparent full thickness tear of the dorsal  component of the scapholunate interosseous ligament. Heterogeneous  appearance of the volar component and membranous component may  degeneratively torn. No associated widening of the scapholunate  interosseous ligament space.  Lunatotriquetral ligament: Not well visualized.     Bones: Multiple areas of marrow edema including the carpal bones, distal radius, and bases of metacarpals.     Articulations:  Joint effusion: Small to moderate radiocarpal and mid carpal pleural effusions with internal debris, likely synovitis.     Tendons:  Flexor tendons: Intact. Small amount of palmar bursal fluid.  Extensor tendons: Trace fluid in the second extensor compartment  tendon, nonspecific. Intact.     Miscellaneous soft tissues: No volar or dorsal ganglion cysts.                                                                   IMPRESSION:  1. Suspect complete tear of dorsal component of scapholunate interosseous ligament and degenerative tear of membranous and volar  components.   2. Extensive marrow edema, joint effusion with internal debris, likely synovitis. Constellation of findings are most concerning for  underlying  inflammatory arthritis. Alternatively reflecting multiple bone contusions though extent seems atypical for contusion.  3. Suspected tearing of the distal lamina of the triangular ligament.    RHEUM RESULTS Latest Ref Rng & Units 5/28/2020   SED RATE 0 - 30 mm/h 45(H)   CRP, INFLAMMATION 0.0 - 8.0 mg/L 7.0   RHEUMATOID FACTOR <12 IU/mL <7   WBC 4.0 - 11.0 10e9/L 8.9   RBC 3.8 - 5.2 10e12/L 4.20   HGB 11.7 - 15.7 g/dL 12.6   HCT 35.0 - 47.0 % 40.8   MCV 78 - 100 fl 97   MCHC 31.5 - 36.5 g/dL 30.9(L)   RDW 10.0 - 15.0 % 13.5    - 450 10e9/L 255     Lab Results   Component Value Date/Time    JEFF Positive (A) 05/28/2020 12:44 PM    ANAP1 CENTROMERE 05/28/2020 12:44 PM    ANAT1 1:1,280 05/28/2020 12:44 PM    RHF <7 05/28/2020 12:44 PM    CCPIGG 1 05/28/2020 12:44 PM     ASSESSMENT:   53 yo F with 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    ## wrist swelling, tendon injury  Her MRI results are concerning but do not correspond to clinical picture-- for example, she denies MCP pain although devyn-MCP bone marrow edema is noted. Stand alone synovitis, particularly unilateral, would be atypical for CREST. However, as the disease can involve tendons and ligaments, it is still possible that following trauma her systemic disease has slowed down or altered her course of healing secondary to trauma-induce inflammation. In addition, I would like to screen her for evidence of overlap syndromes that could more directly explain her synnovitis.    Alternatively, pseudgout can be triggered by trauma and an aspiration closer to her initial presentation to evaluate for CPPD crystals could have been very helpful. At this time she has minimal swelling visible and is unlikely to have much remaining to aspirate. The treatment for this, given focal involvement, would be steroid joint  "injection.    Overall, Maida has a strong aversion to starting any medications, getting any surgeries, or getting any injections that are not necessary. We did discuss that sometimes a steroid injection can be instructive and that a steroid injection prior to hand therapy may improve outcomes with hand therapy. She would prefer to try hand therapy and if her recovery seems to stall would then consider a steroid injection.    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 However, pulmonary artery hypertension can be a late manifestation of the disease and rather insidious at onset. Therapy for scleroderma at this stage is mainly supportive and Maida has no need for supportive therapies as this time. Nonetheless, she will require monitoring long term. We will need a baseline echo, pulmonary function test, and 6MW but, given COVID restrictions, will delay this until next visit in 6 months at which point hopefully our systems are more readily available for in person visits.    PLAN:  1. Referral to hand therapy. If she cannot make appropriate  2. Complete phenotyping workup for systemic sclerosis, rule out overlap syndrome: TRACEY, centromere, RNP, and SCL-70 antibodies  3. RTC in 6 months for echo, PFT, 6MW    Staffed with Dr. Juan José Ngo MD, PhD  Rheumatology Fellow  Pager [phone]  ==================================================  The patient has been notified of following:     \"This video visit will be conducted via a call between you and your physician/provider. We have found that certain health care needs can be provided without the need for an in-person physical exam.  This service lets us provide the care you need with a video conversation.  If a prescription is necessary we can send it directly to your pharmacy.  If lab work is needed we can place an " "order for that and you can then stop by our lab to have the test done at a later time.    Video visits are billed at different rates depending on your insurance coverage.  Please reach out to your insurance provider with any questions.    If during the course of the call the physician/provider feels a video visit is not appropriate, you will not be charged for this service.\"    Patient has given verbal consent for Video visit? Yes    How would you like to obtain your AVS? MyChart    Patient would like the video invitation sent by: Send to e-mail at: @[employer] or [email or web address]    Will anyone else be joining your video visit? No        Video-Visit Details    Type of service:  Video Visit    Video Start Time: 8:07  Video End Time: 9:20    Originating Location (pt. Location): Home    Distant Location (provider location):  [facility] RHEUMATOLOGY     Platform used for Video Visit: Pam Ngo MD, PhD          Ramin Ngo MD    "

## 2020-06-12 NOTE — TELEPHONE ENCOUNTER
Patient returned call on 6-11-20 and left a voice mail to call her.  6-12-20 left VM to return call to RN. Anna Thomas, RN

## 2020-06-23 DIAGNOSIS — M34.1 CREST SYNDROME (H): ICD-10-CM

## 2020-06-23 DIAGNOSIS — M34.9 SYSTEMIC SCLEROSIS WITH LIMITED CUTANEOUS INVOLVEMENT (H): ICD-10-CM

## 2020-06-23 LAB — MISCELLANEOUS TEST: NORMAL

## 2020-06-23 PROCEDURE — 86235 NUCLEAR ANTIGEN ANTIBODY: CPT | Performed by: STUDENT IN AN ORGANIZED HEALTH CARE EDUCATION/TRAINING PROGRAM

## 2020-06-23 PROCEDURE — 36415 COLL VENOUS BLD VENIPUNCTURE: CPT | Performed by: STUDENT IN AN ORGANIZED HEALTH CARE EDUCATION/TRAINING PROGRAM

## 2020-06-25 LAB
CENTROMERE IGG SER-ACNC: >8 AI (ref 0–0.9)
ENA RNP IGG SER IA-ACNC: 0.2 AI (ref 0–0.9)
ENA SCL70 IGG SER IA-ACNC: <0.2 AI (ref 0–0.9)
ENA SM IGG SER-ACNC: <0.2 AI (ref 0–0.9)
ENA SS-A IGG SER IA-ACNC: <0.2 AI (ref 0–0.9)
ENA SS-B IGG SER IA-ACNC: <0.2 AI (ref 0–0.9)

## 2020-07-13 ENCOUNTER — THERAPY VISIT (OUTPATIENT)
Dept: OCCUPATIONAL THERAPY | Facility: CLINIC | Age: 55
End: 2020-07-13
Attending: STUDENT IN AN ORGANIZED HEALTH CARE EDUCATION/TRAINING PROGRAM
Payer: COMMERCIAL

## 2020-07-13 DIAGNOSIS — M34.1 CREST SYNDROME (H): ICD-10-CM

## 2020-07-13 DIAGNOSIS — M19.049 CMC ARTHRITIS: ICD-10-CM

## 2020-07-13 DIAGNOSIS — M25.532 LEFT WRIST PAIN: ICD-10-CM

## 2020-07-13 DIAGNOSIS — M34.9 SYSTEMIC SCLEROSIS WITH LIMITED CUTANEOUS INVOLVEMENT (H): ICD-10-CM

## 2020-07-13 PROCEDURE — 97530 THERAPEUTIC ACTIVITIES: CPT | Mod: GO | Performed by: OCCUPATIONAL THERAPIST

## 2020-07-13 PROCEDURE — 97165 OT EVAL LOW COMPLEX 30 MIN: CPT | Mod: GO | Performed by: OCCUPATIONAL THERAPIST

## 2020-07-13 PROCEDURE — 97140 MANUAL THERAPY 1/> REGIONS: CPT | Mod: GO | Performed by: OCCUPATIONAL THERAPIST

## 2020-07-13 PROCEDURE — 97110 THERAPEUTIC EXERCISES: CPT | Mod: GO | Performed by: OCCUPATIONAL THERAPIST

## 2020-07-13 NOTE — PROGRESS NOTES
Hand Therapy Initial Evaluation    Current Date:  7/13/2020  Referring Physician: Dr. 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    Patient reports symptoms of pain, stiffness/loss of motion, weakness/loss of strength and edema of the left wrist and bilateral thumbs which occurred due to an injury sustained when a picture frame fell on her wrist and has been experiencing pain in both thumbs due to OA. Since onset symptoms are Gradually getting better.  Special tests:  x-ray, blood work and MRI.  Previous treatment: wrist brace.    General health as reported by patient is good.  Pertinent medical history includes:Menopausal, Cold/Hot Extremity  Medical allergies:sulfa, dyoxiline.  Surgical history: other: varicose veins, sinus.  Medication history: Anti-inflammatory.    Occupational Profile Information:  Current occupation is [employer]  Currently working in normal job without restrictions  Job Tasks: Computer Work, Prolonged Sitting, Prolonged Standing, Repetitive Tasks  Prior functional level:  no limitations  Barriers include:none  Mobility: No difficulty  Transportation: drives  Leisure activities/hobbies: golf, yoga, biking    Upper Extremity Functional Index Score:  SCORE:   Column Totals: /80: 51   (A lower score indicates greater disability.)    Objective:  Pain Level (Scale 0-10)    7/13/2020   At Rest Wrist- 1/10  Thumbs 6/10   With Use Wrist - 7-10/10  Thumbs - 7-10/10     Pain Description  Date 7/13/2020   Location Dorsal and ulnar side of the wrist and thumb CMC joints   Pain Quality Aching, Burning and Throbbing   Frequency intermittent     Pain is worst  daytime   Exacerbated by  weight bearing, gripping, pinching, twisting   Relieved by NSAIDs, rest and pain cream, supplements   Progression Gradually improving     Edema  Mild on dorsal wrist    Sensation   WNL  throughout all nerve distributions; per patient report    ROM  Pain Report:  + mild    ++ moderate    +++ severe   Wrist 7/13/2020 7/13/2020   AROM (PROM) Right Left   Extension 65 50   Flexion 75 60   RD 20 20   UD 50 30   Supination 80 80   Pronation 85 80     ROM  Thumb 7/13/2020 7/13/2020   AROM  (PROM) Right Left   MP /30 /20   IP /50 /50   RABD 25 40   PABD 40 40   Kapandji Opposition Scale (0-10/10) 8/10 6/10     Strength   Painfree (Measured in pounds)  Pain Report:  + mild    ++ moderate    +++ severe    7/13/2020 7/13/2020   Trials Right Left   1  2  3 17  15  15 16  16  19   Average 16 17     Lat Pinch 7/13/2020 7/13/2020   Trials Right Left   1  2  3 4 5   Average       3 Pt Pinch 7/13/2020 7/13/2020   Trials Right Left   1  2  3 5 5   Average         Palpation  Pain Report:  - none    + mild    ++ moderate    +++ severe     7/13/20      (L) dorsal wrist +                                            Thumb Observation/Appearance  Key: + = present/ - = not observed    7/13/2020   Shoulder deformity present over CMC R:+  L:+   Volar subluxation present R:-  L:-   Edema over the CMC joint R:+  L:+   Noted collapse of MP into hyperextension during pinch R:  L:   Tenderness at CMC R:+  L:+      Provocative Tests  Pain Report:  - none    + mild    ++ moderate    +++ severe     7/13/2020   CMC Grind test R: +  L: ++   Crepitus present R: -  L: ++   CMC Adduction Stress Test R: -  L: -   CMC Extension Stress Test R: +  L: -   Finkelstein's R: -  L: -     Assessment:  Patient presents with symptoms consistent with diagnosis of CMC thumb arthritis and wrist pain, with conservative intervention.    Patient s limitations or Problem List includes:  Pain, Decreased ROM/motion, weakness, decreased stability of the CMC joint, decreased  and pinch strength of the thumb which interferes with patients ability to perform  self care, dressing, home maintenance, work and sports/recreational as compared to previous level  of function.    Rehab Potential:  Good - Return to full activity, some limitations    Patient will benefit from skilled Occupational Therapy to increase ROM, flexibility, overall strength,  strength, pinch strength, stability of wrist and stability of thumb and decrease pain and edema to return to previous activity level and resume normal daily tasks and to reach their rehab potential.    Barriers to Learning:  No barrier    Communication Issues:  Patient appears to be able to clearly communicate and understand verbal and written communication and follow directions correctly.    Chart Review: Brief history including review of medical and/or therapy records relating to the presenting problem and Simple history review with patient    Identified Performance Deficits: dressing, home establishment and management, meal preparation and cleanup, work and leisure activities    Assessment of Occupational Performance:  3-5 Performance Deficits    Clinical Decision Making (Complexity): Low complexity    Treatment Explanation:  The following has been discussed with the patient:  RX ordered/plan of care  Anticipated outcomes  Possible risks and side effects    Plan:  Frequency:  1 X week, once daily  Duration:  for 6 weeks    Treatment Plan:  Modalities:  Paraffin  Therapeutic Exercise: AROM, PROM, Isometrics, and Stabilization exercises of the Thumb CMC, including active and resisted abduction, 1st DI strengthening  Manual Techniques: Joint Mobilization or reseating of the trapezium, self MFR to thumb adductor with clip  Orthosis fabrication:  Hand based Thumb Spica, Custom neoprene support   Education: Anatomy of CMC, joint protection principles, adaptive equipment as needed    Discharge Plan:  Achieve all LTG  Bureau in home treatment program.  Reach maximal therapeutic benefit.    Home Program:  Warmth/Ice  A/PROM of wrist (flex/ext)  Dart throwers  1st web release with clip  Incorporate joint protection into daily  functional activities  Adaptive equipment as needed.  Edema gloves at night    Next Visit:  US to dorsal wrist if still painful??  Assess for custom CMC orthoses (Patient to bring in her current OTC splints)  Self CMC mobilization on chest  Place and hold pinch  Thumb Stabilization Program with hard  C  and index abd  Self CMC mobilization on chest  Hand based Thumb Spica orthosis night/sleeping and per symptoms during the day   CMC neoprene cool comfort orthosis during the day with ADL s per symptoms

## 2020-07-15 ENCOUNTER — OFFICE VISIT (OUTPATIENT)
Dept: ORTHOPEDICS | Facility: CLINIC | Age: 55
End: 2020-07-15
Payer: COMMERCIAL

## 2020-07-15 DIAGNOSIS — M25.532 LEFT WRIST PAIN: Primary | ICD-10-CM

## 2020-07-15 NOTE — NURSING NOTE
Reason For Visit:   Chief Complaint   Patient presents with     RECHECK     left wrist scapholunate ligament tear & bilateral thumbs     Primary MD: No Ref-Primary, Physician    Age: 54 year old    ?  No    There were no vitals taken for this visit.    Pain Assessment  Patient Currently in Pain: Yes  0-10 Pain Scale: 1  Primary Pain Location: Wrist  Other Pain Locations: bilateral thumb pain 6/10.  Alleviating Factors: Rest    QuickDASH Assessment  No flowsheet data found.     Current Outpatient Medications   Medication Sig Dispense Refill     naproxen sodium 220 MG capsule Take 220 mg by mouth 2 times daily (with meals)       order for DME Equipment being ordered: wrist brace, left 1 Device 0     Allergies   Allergen Reactions     Doxycycline      Sulfa Drugs      Chandni Salinas ATC

## 2020-07-15 NOTE — LETTER
7/15/2020         RE: Maida Menendez  [address] [facility] [place] [address]        Dear Colleague,    Thank you for referring your patient, Maida Menendez, to the [facility]. Please see a copy of my visit note below.    Date of Service: Jul 15, 2020    Chief Complaint: Follow-up wrist pain    History of Present Illness: Maida Menendez comes to see me in follow-up today. Since I last saw her her wrist pain has been a bit better. Motion a bit better. Saw rheumatology. They felt she had a limited systemic sclerosis and that no medication was needed at this point.  Not clear if this would have contributed to her wrist issues in any way.Just had one hand therapy visit. Going to have more. Was wearing brace but caused more pain so wearing less now   Most bothersome right now is a lot of thumb pain bilateral base of thumbs. This is longstanding but getting worse. No constant pain at this point in the wrist which is an improvement. Massage has been helfpul. Went for second opinion at [place] and got thumb cmc splints.       Physical examination:  Well-developed, well-nourished and in no acute distress.  Alert and oriented to surroundings. Extra-ocular motions intact. Cooperative with exam. Respirations unlabored.     Examination of the left upper extremity reveals skin to be clean, dry and intact. There are no wounds. Swelling is minimal- much improved. Deformity is absent. Left wrist limited in ulnar deviation and about 15 deg short flexion compared to otherside but otherwise ROM well maintained. Very tender over thumb CMC joints bilaterally. +thumb cmc grind bilaterally.   Fingers appear well-perfused with good capillary refill    Assessment: 54 year old female with left wrist pain, improving, limited systemic sclerosis, and bilateral thumb cmc arthritis      Plan:   She will continue hand therapy for now. She does not feel taht steroid interventions or other are needed right now based on her continued  improvement. The thumb cmc arthritis is her biggest issue right now. She will let us know if she is interested in pursuing further options for this, which we did review.She will follow-up in 2 mo or as needed.

## 2020-07-15 NOTE — PROGRESS NOTES
Date of Service: Jul 15, 2020    Chief Complaint: Follow-up wrist pain    History of Present Illness: Maida Menendez comes to see me in follow-up today. Since I last saw her her wrist pain has been a bit better. Motion a bit better. Saw rheumatology. They felt she had a limited systemic sclerosis and that no medication was needed at this point.  Not clear if this would have contributed to her wrist issues in any way.Just had one hand therapy visit. Going to have more. Was wearing brace but caused more pain so wearing less now   Most bothersome right now is a lot of thumb pain bilateral base of thumbs. This is longstanding but getting worse. No constant pain at this point in the wrist which is an improvement. Massage has been helfpul. Went for second opinion at [place] and got thumb cmc splints.       Physical examination:  Well-developed, well-nourished and in no acute distress.  Alert and oriented to surroundings. Extra-ocular motions intact. Cooperative with exam. Respirations unlabored.     Examination of the left upper extremity reveals skin to be clean, dry and intact. There are no wounds. Swelling is minimal- much improved. Deformity is absent. Left wrist limited in ulnar deviation and about 15 deg short flexion compared to otherside but otherwise ROM well maintained. Very tender over thumb CMC joints bilaterally. +thumb cmc grind bilaterally.   Fingers appear well-perfused with good capillary refill    Assessment: 54 year old female with left wrist pain, improving, limited systemic sclerosis, and bilateral thumb cmc arthritis      Plan:   She will continue hand therapy for now. She does not feel taht steroid interventions or other are needed right now based on her continued improvement. The thumb cmc arthritis is her biggest issue right now. She will let us know if she is interested in pursuing further options for this, which we did review.She will follow-up in 2 mo or as needed.

## 2020-07-21 ENCOUNTER — THERAPY VISIT (OUTPATIENT)
Dept: OCCUPATIONAL THERAPY | Facility: CLINIC | Age: 55
End: 2020-07-21
Payer: COMMERCIAL

## 2020-07-21 DIAGNOSIS — M25.532 LEFT WRIST PAIN: ICD-10-CM

## 2020-07-21 DIAGNOSIS — M34.1 CREST SYNDROME (H): ICD-10-CM

## 2020-07-21 DIAGNOSIS — M19.049 CMC ARTHRITIS: ICD-10-CM

## 2020-07-21 PROCEDURE — 97760 ORTHOTIC MGMT&TRAING 1ST ENC: CPT | Mod: GO | Performed by: OCCUPATIONAL THERAPIST

## 2020-07-21 PROCEDURE — 97140 MANUAL THERAPY 1/> REGIONS: CPT | Mod: GO | Performed by: OCCUPATIONAL THERAPIST

## 2020-07-21 PROCEDURE — 97110 THERAPEUTIC EXERCISES: CPT | Mod: GO | Performed by: OCCUPATIONAL THERAPIST

## 2020-07-21 NOTE — PROGRESS NOTES
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  DOI: 6/2/20 (Date of order)    Home Program:  Warmth for pain and stiffness  Ice of pain and swelling  A/PROM of wrist E/F and R/U  Dart throwers ROM wrist  1st web release with clip and small ball  Self CMC mobs on chest  Incorporate joint protection into daily functional activities  Adaptive equipment as needed  Edema gloves at night  Custom comfort cool orthosis left thumb for ADL's as needed, consider for night or use OTC wrist splint  OTC bilateral Push MetaGrip Thumb CMC Splints (pt has from another clinic), wear right as needed with ADL's and consider for night rest    Next Visit:  Assess response to self CMC jt mobs and left comfort cool orthosis  Add Thumb Stabilization Program with hard  C  and index abd as tolerated (too painful today)  Consider custom orthoplast right hand based thumb spica and left YANELIS thumb spica orthoses for night/sleeping   Add wrist isometrics and stabilization exercises for left wrist as indicated     Please refer to the daily flowsheet for treatment today, total treatment time and time spent performing 1:1 timed codes.

## 2020-07-22 LAB — LAB SCANNED RESULT: NORMAL

## 2020-07-27 ENCOUNTER — THERAPY VISIT (OUTPATIENT)
Dept: OCCUPATIONAL THERAPY | Facility: CLINIC | Age: 55
End: 2020-07-27
Payer: COMMERCIAL

## 2020-07-27 DIAGNOSIS — M25.532 LEFT WRIST PAIN: ICD-10-CM

## 2020-07-27 DIAGNOSIS — M34.1 CREST SYNDROME (H): ICD-10-CM

## 2020-07-27 DIAGNOSIS — M19.049 CMC ARTHRITIS: ICD-10-CM

## 2020-07-27 PROCEDURE — 97110 THERAPEUTIC EXERCISES: CPT | Mod: GO | Performed by: OCCUPATIONAL THERAPIST

## 2020-07-27 PROCEDURE — 97140 MANUAL THERAPY 1/> REGIONS: CPT | Mod: GO | Performed by: OCCUPATIONAL THERAPIST

## 2020-07-27 NOTE — PROGRESS NOTES
SOAP Note - Hand Therapy - Objective Information    Current Date:  7/27/2020  Referring Physician: Dr. 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    Patient reports symptoms of pain, stiffness/loss of motion, weakness/loss of strength and edema of the left wrist and bilateral thumbs which occurred due to an injury sustained when a picture frame fell on her wrist and has been experiencing pain in both thumbs due to OA.    S:  Subjective changes as noted by patient: The wrist has more flexibility but then it tightens back up.  Thumbs are are a little better I think. It does better to wear the night braces.  Functional changes noted by patient: no changes      O:  Pain Level (Scale 0-10)    7/13/2020 7/27/20   At Rest Wrist- 1/10  Thumbs 6/10 Wrist:1/10  Thumbs: 4/10   With Use Wrist - 7-10/10  Thumbs - 7-10/10 Wrist: 5-9/10  Thumb: 7-10/10     Pain Description  Date 7/13/2020   Location Dorsal and ulnar side of the wrist and thumb CMC joints   Pain Quality Aching, Burning and Throbbing   Frequency intermittent     Pain is worst  daytime   Exacerbated by  weight bearing, gripping, pinching, twisting   Relieved by NSAIDs, rest and pain cream, supplements   Progression Gradually improving     Edema  Mild on dorsal wrist    Sensation   WNL throughout all nerve distributions; per patient report    ROM  Pain Report:  + mild    ++ moderate    +++ severe   Wrist 7/13/2020 7/13/2020 7/27/20   AROM (PROM) Right Left Left   Extension 65 50 55   Flexion 75 60 55   RD 20 20    UD 50 30    Supination 80 80    Pronation 85 80      ROM  Thumb 7/13/2020 7/13/2020   AROM  (PROM) Right Left   MP /30 /20   IP /50 /50   RABD 25 40   PABD 40 40   Kapandji Opposition Scale (0-10/10) 8/10 6/10     Strength   Painfree (Measured in pounds)  Pain Report:  + mild    ++ moderate    +++ severe    7/13/2020 7/13/2020    Trials Right Left   1  2  3 17  15  15 16  16  19   Average 16 17     Lat Pinch 7/13/2020 7/13/2020   Trials Right Left   1  2  3 4 5   Average       3 Pt Pinch 7/13/2020 7/13/2020   Trials Right Left   1  2  3 5 5   Average         Palpation  Pain Report:  - none    + mild    ++ moderate    +++ severe     7/13/20      (L) dorsal wrist +                                            Thumb Observation/Appearance  Key: + = present/ - = not observed    7/13/2020   Shoulder deformity present over CMC R:+  L:+   Volar subluxation present R:-  L:-   Edema over the CMC joint R:+  L:+   Noted collapse of MP into hyperextension during pinch R:  L:   Tenderness at CMC R:+  L:+      Provocative Tests  Pain Report:  - none    + mild    ++ moderate    +++ severe     7/13/2020   CMC Grind test R: +  L: ++   Crepitus present R: -  L: ++   CMC Adduction Stress Test R: -  L: -   CMC Extension Stress Test R: +  L: -   Finkelstein's R: -  L: -     Please refer to the daily flowsheet for treatment provided today.     Home Program:  Warmth for pain and stiffness  Ice of pain and swelling  A/PROM of wrist E/F and R/U  Dart throwers ROM wrist  1st web release with clip and small ball  Self CMC mobs on chest  Incorporate joint protection into daily functional activities  Adaptive equipment as needed  Edema gloves at night  Custom comfort cool orthosis left thumb for ADL's as needed, consider for night or use OTC wrist splint  OTC bilateral Push MetaGrip Thumb CMC Splints (pt has from another clinic), wear right as needed with ADL's and consider for night rest  1st DI AROM    Next Visit:  Add Thumb Stabilization Program with hard  C    Consider custom orthoplast right hand based thumb spica and left YANELIS thumb spica orthoses for night/sleeping   Add wrist isometrics and stabilization exercises for left wrist as indicated

## 2020-08-10 ENCOUNTER — THERAPY VISIT (OUTPATIENT)
Dept: OCCUPATIONAL THERAPY | Facility: CLINIC | Age: 55
End: 2020-08-10
Payer: COMMERCIAL

## 2020-08-10 DIAGNOSIS — M19.049 CMC ARTHRITIS: ICD-10-CM

## 2020-08-10 DIAGNOSIS — M34.1 CREST SYNDROME (H): ICD-10-CM

## 2020-08-10 DIAGNOSIS — M25.532 LEFT WRIST PAIN: ICD-10-CM

## 2020-08-10 PROCEDURE — 97140 MANUAL THERAPY 1/> REGIONS: CPT | Mod: GO | Performed by: OCCUPATIONAL THERAPIST

## 2020-08-10 PROCEDURE — 97035 APP MDLTY 1+ULTRASOUND EA 15: CPT | Mod: GO | Performed by: OCCUPATIONAL THERAPIST

## 2020-08-10 PROCEDURE — 97110 THERAPEUTIC EXERCISES: CPT | Mod: GO | Performed by: OCCUPATIONAL THERAPIST

## 2020-08-10 NOTE — PROGRESS NOTES
SOAP Note - Hand Therapy - Objective Information    Current Date:  8/10/2020  Referring Physician: Dr. 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    Patient reports symptoms of pain, stiffness/loss of motion, weakness/loss of strength and edema of the left wrist and bilateral thumbs which occurred due to an injury sustained when a picture frame fell on her wrist and has been experiencing pain in both thumbs due to OA.    S:  Subjective changes as noted by patient: No real changes in the wrist and thumb.  It kind of goes up and down. Sometimes I have more flexibility in the wrist but then it swells.  I have been getting massages which has helped. Sometimes it is hard to push down on my thumbs.  Functional changes noted by patient: no changes      O:  Pain Level (Scale 0-10)    7/13/2020 7/27/20 8/10/20   At Rest Wrist- 1/10  Thumbs 6/10 Wrist:1/10  Thumbs: 4/10 Wrist: 1/10  Thumbs: 1/10   With Use Wrist - 7-10/10  Thumbs - 7-10/10 Wrist: 5-9/10  Thumb: 7-10/10 Wrist: 7-8/10  Thumbs: 8-10/10     Pain Description  Date 7/13/2020   Location Dorsal and ulnar side of the wrist and thumb CMC joints   Pain Quality Aching, Burning and Throbbing   Frequency intermittent     Pain is worst  daytime   Exacerbated by  weight bearing, gripping, pinching, twisting   Relieved by NSAIDs, rest and pain cream, supplements   Progression Gradually improving     Edema  Mild on dorsal wrist,  8/10/20: ulnar side of wrist    Sensation   WNL throughout all nerve distributions; per patient report    ROM  Pain Report:  + mild    ++ moderate    +++ severe   Wrist 7/13/2020 7/13/2020 7/27/20 8/10/20   AROM (PROM) Right Left Left Left   Extension 65 50 55 55   Flexion 75 60 55 50   RD 20 20  15   UD 50 30  30   Supination 80 80     Pronation 85 80       ROM  Thumb 7/13/2020 7/13/2020 8/10/20   AROM  (PROM) Right Left Left   MP  /30 /20    IP /50 /50    RABD 25 40    PABD 40 40    Kapandji Opposition Scale (0-10/10) 8/10 6/10 6/10     Strength   Painfree (Measured in pounds)  Pain Report:  + mild    ++ moderate    +++ severe    7/13/2020 7/13/2020   Trials Right Left   1  2  3 17  15  15 16  16  19   Average 16 17     Lat Pinch 7/13/2020 7/13/2020   Trials Right Left   1  2  3 4 5   Average       3 Pt Pinch 7/13/2020 7/13/2020   Trials Right Left   1  2  3 5 5   Average         Palpation  Pain Report:  - none    + mild    ++ moderate    +++ severe     7/13/20      (L) dorsal wrist +                                            Thumb Observation/Appearance  Key: + = present/ - = not observed    7/13/2020   Shoulder deformity present over CMC R:+  L:+   Volar subluxation present R:-  L:-   Edema over the CMC joint R:+  L:+   Noted collapse of MP into hyperextension during pinch R:  L:   Tenderness at CMC R:+  L:+      Provocative Tests  Pain Report:  - none    + mild    ++ moderate    +++ severe     7/13/2020   CMC Grind test R: +  L: ++   Crepitus present R: -  L: ++   CMC Adduction Stress Test R: -  L: -   CMC Extension Stress Test R: +  L: -   Finkelstein's R: -  L: -     Please refer to the daily flowsheet for treatment provided today.     Home Program:  Warmth for pain and stiffness  Ice of pain and swelling  A/PROM of wrist E/F   Dart throwers ROM wrist  1st web release with clip and small ball  Self CMC mobs on chest  Incorporate joint protection into daily functional activities  Adaptive equipment as needed  Edema gloves at night  Custom comfort cool orthosis left thumb for ADL's as needed, consider for night or use OTC wrist splint  OTC bilateral Push MetaGrip Thumb CMC Splints (pt has from another clinic), wear right as needed with ADL's and consider for night rest  1st DI AROM    Next Visit:  Add Thumb Stabilization Program with hard  C    Consider custom orthoplast right hand based thumb spica and left YANELIS thumb spica orthoses  for night/sleeping   Add wrist isometrics and stabilization exercises for left wrist as indicated

## 2020-08-24 ENCOUNTER — THERAPY VISIT (OUTPATIENT)
Dept: OCCUPATIONAL THERAPY | Facility: CLINIC | Age: 55
End: 2020-08-24
Payer: COMMERCIAL

## 2020-08-24 DIAGNOSIS — M19.049 CMC ARTHRITIS: ICD-10-CM

## 2020-08-24 DIAGNOSIS — M25.532 LEFT WRIST PAIN: ICD-10-CM

## 2020-08-24 DIAGNOSIS — M34.1 CREST SYNDROME (H): ICD-10-CM

## 2020-08-24 PROCEDURE — 97140 MANUAL THERAPY 1/> REGIONS: CPT | Mod: GO | Performed by: OCCUPATIONAL THERAPIST

## 2020-08-24 PROCEDURE — 97110 THERAPEUTIC EXERCISES: CPT | Mod: GO | Performed by: OCCUPATIONAL THERAPIST

## 2020-08-24 PROCEDURE — 97018 PARAFFIN BATH THERAPY: CPT | Mod: GO | Performed by: OCCUPATIONAL THERAPIST

## 2020-08-24 NOTE — PROGRESS NOTES
SOAP Note - Hand Therapy - Objective Information    Current Date:  8/24/2020  Referring Physician: Dr. 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    Patient reports symptoms of pain, stiffness/loss of motion, weakness/loss of strength and edema of the left wrist and bilateral thumbs which occurred due to an injury sustained when a picture frame fell on her wrist and has been experiencing pain in both thumbs due to OA.    S:  Subjective changes as noted by patient: I got a massage and it trigger some inflamation.  It has gone down now and I feel I can bend the wrist more.  Functional changes noted by patient: no changes      O:  Pain Level (Scale 0-10)    7/13/2020 7/27/20 8/10/20 8/24/20   At Rest Wrist- 1/10  Thumbs 6/10 Wrist:1/10  Thumbs: 4/10 Wrist: 1/10  Thumbs: 1/10 Wrist 0/10  Thumbs 1/10   With Use Wrist - 7-10/10  Thumbs - 7-10/10 Wrist: 5-9/10  Thumb: 7-10/10 Wrist: 7-8/10  Thumbs: 8-10/10 Wrist: 5/10  Thumbs: 8/10     Pain Description  Date 7/13/2020   Location Dorsal and ulnar side of the wrist and thumb CMC joints   Pain Quality Aching, Burning and Throbbing   Frequency intermittent     Pain is worst  daytime   Exacerbated by  weight bearing, gripping, pinching, twisting   Relieved by NSAIDs, rest and pain cream, supplements   Progression Gradually improving     Edema  Mild on dorsal wrist,  8/10/20: ulnar side of wrist    Sensation   WNL throughout all nerve distributions; per patient report    ROM  Pain Report:  + mild    ++ moderate    +++ severe   Wrist 7/13/2020 7/13/2020 7/27/20 8/10/20 8/24/20   AROM (PROM) Right Left Left Left Left   Extension 65 50 55 55 55   Flexion 75 60 55 50 55   RD 20 20  15 20   UD 50 30  30 35   Supination 80 80      Pronation 85 80        ROM  Thumb 7/13/2020 7/13/2020 8/10/20   AROM  (PROM) Right Left Left   MP /30 /20    IP /50 /50    RABD 25 40     PABD 40 40    Kapandji Opposition Scale (0-10/10) 8/10 6/10 6/10     Strength   Painfree (Measured in pounds)  Pain Report:  + mild    ++ moderate    +++ severe    7/13/2020 7/13/2020 8/24/20 8/24/20   Trials Right Left Right Left   1  2  3 17  15  15 16  16  19 10 17   Average 16 17       Lat Pinch 7/13/2020 7/13/2020   Trials Right Left   1  2  3 4 5   Average       3 Pt Pinch 7/13/2020 7/13/2020   Trials Right Left   1  2  3 5 5   Average         Palpation  Pain Report:  - none    + mild    ++ moderate    +++ severe     7/13/20 8/24/20     (L) dorsal wrist + +                                           Thumb Observation/Appearance  Key: + = present/ - = not observed    7/13/2020   Shoulder deformity present over CMC R:+  L:+   Volar subluxation present R:-  L:-   Edema over the CMC joint R:+  L:+   Noted collapse of MP into hyperextension during pinch R:  L:   Tenderness at CMC R:+  L:+      Provocative Tests  Pain Report:  - none    + mild    ++ moderate    +++ severe     7/13/2020   CMC Grind test R: +  L: ++   Crepitus present R: -  L: ++   CMC Adduction Stress Test R: -  L: -   CMC Extension Stress Test R: +  L: -   Finkelstein's R: -  L: -     Please refer to the daily flowsheet for treatment provided today.     Home Program:  Warmth for pain and stiffness  Ice of pain and swelling  A/PROM of wrist E/F   Dart throwers ROM wrist  1st web release with clip and small ball  Self CMC mobs on chest  Incorporate joint protection into daily functional activities  Adaptive equipment as needed  Edema gloves at night  Custom comfort cool orthosis left thumb for ADL's as needed, consider for night or use OTC wrist splint  OTC bilateral Push MetaGrip Thumb CMC Splints (pt has from another clinic), wear right as needed with ADL's and consider for night rest  1st DI AROM  Thumb Stabilization Program with hard  C      Next Visit:  Review Thumb Stabilization Program with hard  C    Consider custom orthoplast right hand  based thumb spica and left YANELIS thumb spica orthoses for night/sleeping   Add wrist isometrics and stabilization exercises for left wrist as indicated

## 2020-09-03 ENCOUNTER — THERAPY VISIT (OUTPATIENT)
Dept: OCCUPATIONAL THERAPY | Facility: CLINIC | Age: 55
End: 2020-09-03
Payer: COMMERCIAL

## 2020-09-03 DIAGNOSIS — M19.049 CMC ARTHRITIS: ICD-10-CM

## 2020-09-03 DIAGNOSIS — M25.532 LEFT WRIST PAIN: ICD-10-CM

## 2020-09-03 DIAGNOSIS — M34.1 CREST SYNDROME (H): ICD-10-CM

## 2020-09-03 PROCEDURE — 97763 ORTHC/PROSTC MGMT SBSQ ENC: CPT | Mod: GO | Performed by: OCCUPATIONAL THERAPIST

## 2020-09-03 PROCEDURE — 97110 THERAPEUTIC EXERCISES: CPT | Mod: GO | Performed by: OCCUPATIONAL THERAPIST

## 2020-09-03 NOTE — PROGRESS NOTES
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 It still hurts to weight bear on the left wrist/hand.  Functional changes noted by patient:  Improvement in Household Chores  Patient has noted adverse reaction to:  None    Objective:  Pain Level (Scale 0-10)    7/13/2020 9/3/2020   At Rest Wrist- 1  Thumbs 6 Wrist - 0-1  Thumbs - 0-1   With Use Wrist - 7-10  Thumbs - 7-10 Wrist - 4  Thumbs - 5     Pain Description  Date 7/13/2020 9/3/2020   Location Dorsal and ulnar side of the wrist and thumb CMC joints Left wrist and bilateral thumbs   Pain Quality Aching, Burning and Throbbing soreness   Frequency intermittent   intermittent   Pain is worst  daytime daytime   Exacerbated by  weight bearing, gripping, pinching, twisting Weight bearing, gripping, pinching   Relieved by NSAIDs, rest and pain cream, supplements    Progression Gradually improving Gradually improving     Edema  Continued mild on dorsal wrist    Sensation   WNL throughout all nerve distributions; per patient report    ROM  Pain Report:  + mild    ++ moderate    +++ severe   Wrist 7/13/2020 7/13/2020 9/3/2020   AROM (PROM) Right Left Left   Extension 65 50 50   Flexion 75 60 55   RD 20 20 15   UD 50 30 30   Supination 80 80 80   Pronation 85 80 80     ROM  Thumb 7/13/2020 7/13/2020 9/3/2020 9/3/2020   AROM  (PROM) Right Left Right Left   MP /30 /20 /35 /25   IP /50 /50 /55 /55   RABD 25 40 35 40   PABD 40 40 40 35   Kapandji Opposition Scale (0-10/10) 8/10 6/10 8/10 7/10     Strength   Painfree (Measured in pounds)  Pain Report:  + mild    ++ moderate    +++ severe    7/13/2020 7/13/2020 9/3/2020 9/3/2020   Trials Right Left Right Left   1  2  3 17  15  15 16  16  19 30  34  30 14  14  18   Average 16 17 31 15     Lat Pinch  7/13/2020 7/13/2020 9/3/2020 9/3/2020   Trials Right Left Right Left   1 4 5 5 6     3 Pt Pinch 7/13/2020 7/13/2020 9/3/2020 9/3/2020   Trials Right Left Right Left   1 5 5 6 3      Provocative Tests  Pain Report:  - none    + mild    ++ moderate    +++ severe     7/13/2020   CMC Grind test R: +  L: ++   Crepitus present R: -  L: ++   CMC Adduction Stress Test R: -  L: -   CMC Extension Stress Test R: +  L: -   Finkelstein's R: -  L: -     Please refer to the daily flowsheet for treatment provided today.     Assessment:  Response to therapy has been improvement to:  ROM of Thumb:  All Planes  Strength:   and pinch  Pain:  intensity of pain is decreased    Overall Assessment:  Patient is progressing well and is ready to decrease frequency of treatment in the clinic.  Patient would benefit from continued therapy to achieve rehab potential  STG/LTG:  STGoals have been reviewed and progress or achievement has occurred;  see goal sheet for details and updates.  I have re-evaluated this patient and find that the nature, scope, duration and intensity of the therapy is appropriate for the medical condition of the patient.    Plan:  Frequency/Duration:  Recommend continuing with the current treatment plan. 2 X a month, once daily  for 1 month    Recommendations for Continued Therapy  Treatment Plan:  Modalities:  Paraffin  Therapeutic Exercise: AROM, PROM, Isometrics, and Stabilization exercises of the Thumb CMC, including active and resisted abduction, 1st DI strengthening  Manual Techniques: Joint Mobilization or reseating of the trapezium, self MFR to thumb adductor with clip  Orthosis fabrication:  Hand based Thumb Spica, Custom neoprene support   Education: Anatomy of CMC, joint protection principles, adaptive equipment as needed    Home Program:  Warmth for pain and stiffness  Ice of pain and swelling  A/PROM of wrist E/F   Dart throwers ROM wrist  1st web release with clip and small ball  Self CMC mobs on  "chest  Incorporate joint protection into daily functional activities  Edema gloves at night  Custom comfort cool orthoses bilateral thumb for ADL's as needed, consider for night or use OTC wrist splint  OTC bilateral Push MetaGrip Thumb CMC Splints (pt has from another clinic), wear right as needed with ADL's and consider for night rest  Thumb Stabilization Program with hard  C\" and isometric 1st DI  Wrist isometric for E/F and R/U  Adaptive equipment as needed    Next Visit:  See in 2 weeks  Assess response to right comfort cool orthosis and left wrist isometrics   Consider custom orthoplast right hand based thumb spica and left YANELIS thumb spica orthoses for night/sleeping   Add wrist stabilization exercises for left wrist as indicated    "

## 2020-09-14 ENCOUNTER — THERAPY VISIT (OUTPATIENT)
Dept: OCCUPATIONAL THERAPY | Facility: CLINIC | Age: 55
End: 2020-09-14
Payer: COMMERCIAL

## 2020-09-14 DIAGNOSIS — M19.049 CMC ARTHRITIS: ICD-10-CM

## 2020-09-14 DIAGNOSIS — M25.532 LEFT WRIST PAIN: ICD-10-CM

## 2020-09-14 DIAGNOSIS — M34.1 CREST SYNDROME (H): ICD-10-CM

## 2020-09-14 PROCEDURE — 97763 ORTHC/PROSTC MGMT SBSQ ENC: CPT | Mod: GO | Performed by: OCCUPATIONAL THERAPIST

## 2020-09-14 PROCEDURE — 97110 THERAPEUTIC EXERCISES: CPT | Mod: GO | Performed by: OCCUPATIONAL THERAPIST

## 2020-09-14 NOTE — PROGRESS NOTES
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  DOI: 6/2/20 (Date of order)    Pain Level (Scale 0-10)    7/13/2020 9/3/2020 9/14/2020   At Rest Wrist- 1  Thumbs 6 Wrist - 0-1  Thumbs - 0-1 Wrist -   Thumbs -    With Use Wrist - 7-10  Thumbs - 7-10 Wrist - 4  Thumbs - 5 Wrist - 9-10  Thumbs - 6-8     Home Program:  Warmth for pain and stiffness, consider home paraffin   Ice of pain and swelling  A/PROM of wrist E/F   Dart throwers ROM wrist  1st web release with clip and small ball  Self CMC mobs on chest  Incorporate joint protection into daily functional activities  Edema gloves at night  Custom comfort cool orthoses bilateral thumb for ADL's as needed, consider for night or use OTC wrist splint  OTC bilateral Push MetaGrip Thumb CMC Splints (pt has from another clinic), wear right as needed with ADL's   Custom orthoplast HBTS for right thumb sleeping  Custom extension gutter orthosis for left long finger sleeping  Thumb Stabilization Program with hard  C\" and isometric 1st DI  Wrist isometric for E/F and R/U  Adaptive equipment as needed    Next Visit:  See in 2 weeks  Assess response to right custom orthoplast right hand based thumb spica and left long finger extension gutter orthoses  Consider left YANELIS thumb spica orthoses for night/sleeping   Add wrist stabilization exercises for left wrist as indicated    Anticipate discharge to [facility]    Please refer to the daily flowsheet for treatment today, total treatment time and time spent performing 1:1 timed codes.   "

## 2020-09-28 ENCOUNTER — THERAPY VISIT (OUTPATIENT)
Dept: OCCUPATIONAL THERAPY | Facility: CLINIC | Age: 55
End: 2020-09-28
Payer: COMMERCIAL

## 2020-09-28 DIAGNOSIS — M25.532 LEFT WRIST PAIN: ICD-10-CM

## 2020-09-28 DIAGNOSIS — M19.049 CMC ARTHRITIS: ICD-10-CM

## 2020-09-28 DIAGNOSIS — M34.1 CREST SYNDROME (H): ICD-10-CM

## 2020-09-28 PROCEDURE — 97763 ORTHC/PROSTC MGMT SBSQ ENC: CPT | Mod: GO | Performed by: OCCUPATIONAL THERAPIST

## 2020-09-28 PROCEDURE — 97110 THERAPEUTIC EXERCISES: CPT | Mod: GO | Performed by: OCCUPATIONAL THERAPIST

## 2020-09-28 NOTE — PROGRESS NOTES
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 The right thumb splint helps a little and so does the left long finger splint at night.  Functional changes noted by patient:  No Change to Household Chores  Patient has noted adverse reaction to:  None    Objective:  Pain Level (Scale 0-10)    7/13/2020 9/3/2020 9/28/2020   At Rest Wrist- 1  Thumbs 6 Wrist - 0-1  Thumbs - 0-1 Wrist - 5  Thumbs - 0   With Use Wrist - 7-10  Thumbs - 7-10 Wrist - 4  Thumbs - 5 Wrist - 5  Thumbs - 5     Pain Description  Date 7/13/2020 9/3/2020 9/28/2020   Location Dorsal and ulnar side of the wrist and thumb CMC joints Left wrist and bilateral thumbs Left wrist and bilateral thumbs and left long finger   Pain Quality Aching, Burning and Throbbing soreness soreness   Frequency intermittent   intermittent intermittent   Pain is worst  daytime daytime daytime   Exacerbated by  weight bearing, gripping, pinching, twisting Weight bearing, gripping, pinching Weight bearing, gripping, pinching   Relieved by NSAIDs, rest and pain cream, supplements     Progression Gradually improving Gradually improving Unchanged     Edema  Continued mild on dorsal left wrist    Sensation   WNL throughout all nerve distributions; per patient report    ROM  Pain Report:  + mild    ++ moderate    +++ severe   Wrist 7/13/2020 7/13/2020 9/3/2020 9/28/2020   AROM (PROM) Right Left Left Left   Extension 65 50 50 45   Flexion 75 60 55 50   RD 20 20 15    UD 50 30 30    Supination 80 80 80    Pronation 85 80 80      ROM  Thumb 7/13/2020 7/13/2020 9/3/2020 9/3/2020 9/28/2020 9/28/2020   AROM  (PROM) Right Left Right Left Right Left   MP /30 /20 /35 /25 /30 /25   IP /50 /50 /55 /55 /60 /60   RABD 25 40 35 40     PABD 40 40 40 35      Jocelynji Opposition Scale (0-10/10) 8/10 6/10 8/10 7/10       Strength   Painfree (Measured in pounds)  Pain Report:  + mild    ++ moderate    +++ severe    7/13/2020 7/13/2020 9/3/2020 9/3/2020 9/28/2020 9/28/2020   Trials Right Left Right Left Right Left   1  2  3 17  15  15 16  16  19 30  34  30 14  14  18 10+ 12+   Average 16 17 31 15       Lat Pinch 7/13/2020 7/13/2020 9/3/2020 9/3/2020   Trials Right Left Right Left   1 4 5 5 6     3 Pt Pinch 7/13/2020 7/13/2020 9/3/2020 9/3/2020   Trials Right Left Right Left   1 5 5 6 3      Provocative Tests  Pain Report:  - none    + mild    ++ moderate    +++ severe     7/13/2020   CMC Grind test R: +  L: ++   Crepitus present R: -  L: ++   CMC Adduction Stress Test R: -  L: -   CMC Extension Stress Test R: +  L: -   Finkelstein's R: -  L: -     Please refer to the daily flowsheet for treatment provided today.     Assessment:  Response to therapy has been improvement to:  Pain:  intensity of pain is decreased  Response to therapy has been lack of progress in:  ROM of Wrist:  All Planes  Strength:   and pinch    Overall Assessment:  Patient's progress has plateaued and she is independent in home exercise program  STG/LTG:  STGoals have been reviewed and no progress has been made;  see goal sheet for details and changes.  I have re-evaluated this patient and find that the nature, scope, duration and intensity of the therapy is appropriate for the medical condition of the patient.    Plan:  Frequency/Duration:  Discharge from Hand Therapy; continue home program.    Recommendations for Continued Therapy  Home Program:  Warmth for pain and stiffness, consider home paraffin   Ice of pain and swelling  A/PROM of wrist E/F   Dart throwers ROM wrist  1st web release with clip and small ball  Self CMC mobs on chest  Incorporate joint protection into daily functional activities  Edema gloves at night  Custom comfort cool orthoses bilateral thumb for ADL's as needed,  "consider for night or use OTC wrist splint  OTC bilateral Push MetaGrip Thumb CMC Splints (pt has from another clinic), wear right as needed with ADL's   Custom orthoplast HBTS for right thumb sleeping  Custom extension gutter orthosis for left long finger sleeping  Custom left YANELIS thumb spica orthoses for night/sleeping  Thumb Stabilization Program with hard  C\" and isometric 1st DI  Wrist isometric for E/F and R/U  Isometric  strengthening  Adaptive equipment as needed  Recommend MD f/u if no change with HEP over next 1-2 months  "

## 2020-10-09 ENCOUNTER — MYC MEDICAL ADVICE (OUTPATIENT)
Dept: RHEUMATOLOGY | Facility: CLINIC | Age: 55
End: 2020-10-09

## 2020-10-09 DIAGNOSIS — M25.532 LEFT WRIST PAIN: Primary | ICD-10-CM

## 2020-10-13 DIAGNOSIS — M25.532 LEFT WRIST PAIN: ICD-10-CM

## 2020-10-13 LAB — CRP SERPL-MCNC: 4.5 MG/L (ref 0–8)

## 2020-10-13 PROCEDURE — 86140 C-REACTIVE PROTEIN: CPT | Performed by: STUDENT IN AN ORGANIZED HEALTH CARE EDUCATION/TRAINING PROGRAM

## 2020-10-13 PROCEDURE — 86618 LYME DISEASE ANTIBODY: CPT | Performed by: STUDENT IN AN ORGANIZED HEALTH CARE EDUCATION/TRAINING PROGRAM

## 2020-10-14 ENCOUNTER — TELEPHONE (OUTPATIENT)
Dept: ORTHOPEDICS | Facility: CLINIC | Age: 55
End: 2020-10-14

## 2020-10-14 LAB — B BURGDOR IGG+IGM SER QL: 0.29 (ref 0–0.89)

## 2020-10-14 NOTE — TELEPHONE ENCOUNTER
Spoke with patient about her appointment on Monday with Dr. Echeverria. I was asking for some clarification where the swelling is. The patient indicates that it is in the wrist and into the index finger. The patient wasn't sure she wanted an injection, just the aspiration. Since it isn't clear if Dr. Echeverria would be able to do the aspiration or not without looking at it first, I offered to schedule her to follow up with Dr. Arrieta (staff ok) for potential aspiration and/or injection since she was being followed by Dr. Arrieta. The patient agreed to the reschedule, and accepted the 12:30 pm appointment on Monday, 10/19 with Dr. Arrieta.

## 2020-10-19 ENCOUNTER — OFFICE VISIT (OUTPATIENT)
Dept: ORTHOPEDICS | Facility: CLINIC | Age: 55
End: 2020-10-19
Payer: COMMERCIAL

## 2020-10-19 ENCOUNTER — ANCILLARY PROCEDURE (OUTPATIENT)
Dept: GENERAL RADIOLOGY | Facility: CLINIC | Age: 55
End: 2020-10-19
Attending: ORTHOPAEDIC SURGERY
Payer: COMMERCIAL

## 2020-10-19 DIAGNOSIS — M79.89 FINGER SWELLING: Primary | ICD-10-CM

## 2020-10-19 DIAGNOSIS — M79.89 FINGER SWELLING: ICD-10-CM

## 2020-10-19 LAB
CRYSTALS SNV MICRO: NORMAL
GRAM STN SPEC: NORMAL
GRAM STN SPEC: NORMAL
Lab: NORMAL
RADIOLOGIST FLAGS: NORMAL
SPECIMEN SOURCE SNV: NORMAL
SPECIMEN SOURCE: NORMAL

## 2020-10-19 PROCEDURE — 99000 SPECIMEN HANDLING OFFICE-LAB: CPT | Performed by: PATHOLOGY

## 2020-10-19 PROCEDURE — 99207 PR DROP WITH A PROCEDURE: CPT | Performed by: ORTHOPAEDIC SURGERY

## 2020-10-19 PROCEDURE — 87075 CULTR BACTERIA EXCEPT BLOOD: CPT | Mod: 90 | Performed by: PATHOLOGY

## 2020-10-19 PROCEDURE — 87205 SMEAR GRAM STAIN: CPT | Mod: 90 | Performed by: PATHOLOGY

## 2020-10-19 PROCEDURE — 89060 EXAM SYNOVIAL FLUID CRYSTALS: CPT | Mod: 90 | Performed by: PATHOLOGY

## 2020-10-19 PROCEDURE — 20605 DRAIN/INJ JOINT/BURSA W/O US: CPT | Performed by: ORTHOPAEDIC SURGERY

## 2020-10-19 PROCEDURE — 73130 X-RAY EXAM OF HAND: CPT | Mod: LT | Performed by: RADIOLOGY

## 2020-10-19 PROCEDURE — 87070 CULTURE OTHR SPECIMN AEROBIC: CPT | Mod: 90 | Performed by: PATHOLOGY

## 2020-10-19 NOTE — LETTER
10/19/2020         RE: Maida Menendez  [id number] [place] [address]        Dear Colleague,    Thank you for referring your patient, Maida Menendez, to the [facility] [place]. Please see a copy of my visit note below.    Date of Service: Oct 19, 2020    Chief Complaint:   Chief Complaint   Patient presents with     RECHECK     Left wrist follow up       History of Present Illness: Maida Menendez is a 54 year old, female who presents today for follow-up evaluation of the above problem. Since her last visit she is doing a bit better. She can lift things that are light but she is still unable to play golf. She notices she still has swelling. She has also developed swelling recently the the middle finger PIP joint. She has not had trauma here. The most significant pain right now is actually in the thumb CMC Joint. Pinch causes pain. She was referred by to me by rheumatology to have an aspiration as they are seeing patients only virtually now.     Physical examination:  The patient is well-developed, well nourished and in on acute distress. The patient is alert and oriented to the surroundings. Behavior is appropriate to the surroundings. Extra-ocular motions are intact. Respirations appear unlabored.     Examination of the left upper extremity reveals skin to be clean, dry and intact. There are no wounds. Swelling is Mild over the dorsal wrist. There also is some evidnet swelling in the ulnocarpal joint. There is also some swelling of the middle finger PIP joint. There is TTP over radiolunate joint, thumb cmc joint, and middle finger PIP joint. She is able to flex and extend the fingers with good usuppleness. She has a positive thumb cmc grind.     Sensation to light touch is intact as follows:  median, radial and ulnar    Fingers appear well-perfused with good capillary refill    Imaging: Xrays of the hand  obtained today shows no diminished joint space of the MF PIP joint but soft  tissue swelling. THere is arthrosis of the thumb cmc and stt joints Also some narrowing of the intercarpal joints diffusely.       Assessment: 54 year old female with  Left hand/wrist polyarticular pain and swelling.     Plan:   Maida has been doing hand therapy. She feels she is slightly better. I think she would benefit from a steroid injection into the wrist but she is averse to that at this point. We discussed an aspiration and she was amenable for diagnostic purposes. We discussed that often times there is too little fluid in the wrist but I think its worth a try. I will notify her of the resutls. She will follow-up with rheumatology in December and with me prn.     Procedure:   After written informed consent was obtained, the patient's left wrist was prepped with chloraprep.  A 22 gauge needle was introduced into the radiocarpal joint. No fluid could be aspirated. A small amount of sterile saline was then injected into the joint and reaspirated. Upon reaspiration it had a blood-tinged appearance. This was sent for crystal analysis and cultures. A dressing was applied.        Jay Arrieta MD

## 2020-10-19 NOTE — PROGRESS NOTES
Date of Service: Oct 19, 2020    Chief Complaint:   Chief Complaint   Patient presents with     RECHECK     Left wrist follow up       History of Present Illness: Maida Menendez is a 54 year old, female who presents today for follow-up evaluation of the above problem. Since her last visit she is doing a bit better. She can lift things that are light but she is still unable to play golf. She notices she still has swelling. She has also developed swelling recently the the middle finger PIP joint. She has not had trauma here. The most significant pain right now is actually in the thumb CMC Joint. Pinch causes pain. She was referred by to me by rheumatology to have an aspiration as they are seeing patients only virtually now.     Physical examination:  The patient is well-developed, well nourished and in on acute distress. The patient is alert and oriented to the surroundings. Behavior is appropriate to the surroundings. Extra-ocular motions are intact. Respirations appear unlabored.     Examination of the left upper extremity reveals skin to be clean, dry and intact. There are no wounds. Swelling is Mild over the dorsal wrist. There also is some evidnet swelling in the ulnocarpal joint. There is also some swelling of the middle finger PIP joint. There is TTP over radiolunate joint, thumb cmc joint, and middle finger PIP joint. She is able to flex and extend the fingers with good usuppleness. She has a positive thumb cmc grind.     Sensation to light touch is intact as follows:  median, radial and ulnar    Fingers appear well-perfused with good capillary refill    Imaging: Xrays of the hand  obtained today shows no diminished joint space of the MF PIP joint but soft tissue swelling. THere is arthrosis of the thumb cmc and stt joints Also some narrowing of the intercarpal joints diffusely.       Assessment: 54 year old female with  Left hand/wrist polyarticular pain and swelling.     Plan:   Maida has been doing hand  therapy. She feels she is slightly better. I think she would benefit from a steroid injection into the wrist but she is averse to that at this point. We discussed an aspiration and she was amenable for diagnostic purposes. We discussed that often times there is too little fluid in the wrist but I think its worth a try. I will notify her of the resutls. She will follow-up with rheumatology in December and with me prn.     Procedure:   After written informed consent was obtained, the patient's left wrist was prepped with chloraprep.  A 22 gauge needle was introduced into the radiocarpal joint. No fluid could be aspirated. A small amount of sterile saline was then injected into the joint and reaspirated. Upon reaspiration it had a blood-tinged appearance. This was sent for crystal analysis and cultures. A dressing was applied.

## 2020-10-19 NOTE — NURSING NOTE
Reason For Visit:   Chief Complaint   Patient presents with     RECHECK     Left wrist follow up       Primary MD: No Ref-Primary, Physician    Age: 54 year old    ?  No      There were no vitals taken for this visit.      Pain Assessment  Patient Currently in Pain: Yes  0-10 Pain Scale: 7  Primary Pain Location: Hand(Left wrist and hand)               QuickDASH Assessment  No flowsheet data found.       Current Outpatient Medications   Medication Sig Dispense Refill     naproxen sodium 220 MG capsule Take 220 mg by mouth 2 times daily (with meals)       order for DME Equipment being ordered: wrist brace, left 1 Device 0       Allergies   Allergen Reactions     Doxycycline      Sulfa Drugs        Edwige Brown, ATC

## 2020-10-21 ENCOUNTER — TELEPHONE (OUTPATIENT)
Dept: ORTHOPEDICS | Facility: CLINIC | Age: 55
End: 2020-10-21

## 2020-10-21 NOTE — TELEPHONE ENCOUNTER
Per Dr Jay Arrieta, there were no crystals in her fluid sample. There were a lot of WBCs which is suggestive of inflammation. Patient should follow-up with her rheumatology team at this point for further discussion of what the etiology of this and the swollen index finger may be.       Patient informed of test results and recommendations and thanked Dr Arrieta.  She will contact rheumatology for further follow up. Anna Thomas RN

## 2020-10-21 NOTE — TELEPHONE ENCOUNTER
Warm transfer from call center. Incidental finding from radiology.     10/19/2020 Left Hand XR ordered by Dr Arrieta:    Consider Follow Up: Combination of findings can be seen in rheumatoid  or other inflammatory arthritides, recommend clinical correlation    Forward message to care team to advice.     Chandni Salinas ATC

## 2020-10-21 NOTE — TELEPHONE ENCOUNTER
Patient has been informed of results earlier today with recommendations from Dr Arrieta to follow up with her rheumatology team due to inflammatory process.  Patient stated she would call them to schedule appointment. Anna Thomas RN

## 2020-10-24 LAB
BACTERIA SPEC CULT: NO GROWTH
Lab: NORMAL
SPECIMEN SOURCE: NORMAL

## 2020-10-28 ENCOUNTER — VIRTUAL VISIT (OUTPATIENT)
Dept: RHEUMATOLOGY | Facility: CLINIC | Age: 55
End: 2020-10-28
Attending: STUDENT IN AN ORGANIZED HEALTH CARE EDUCATION/TRAINING PROGRAM
Payer: COMMERCIAL

## 2020-10-28 DIAGNOSIS — M06.042 RHEUMATOID ARTHRITIS INVOLVING LEFT HAND WITH NEGATIVE RHEUMATOID FACTOR (H): Primary | ICD-10-CM

## 2020-10-28 PROCEDURE — 99213 OFFICE O/P EST LOW 20 MIN: CPT | Mod: 95 | Performed by: STUDENT IN AN ORGANIZED HEALTH CARE EDUCATION/TRAINING PROGRAM

## 2020-10-28 ASSESSMENT — PAIN SCALES - GENERAL: PAINLEVEL: SEVERE PAIN (6)

## 2020-10-28 NOTE — PATIENT INSTRUCTIONS
We spoke a lot today about inflammatory arthritis and spent some time comparing it to osteo-arthritis (wear and tear arthritis) as well as post-injury changes. You likely have a mix of multiple of these and they interact with each other to make things worst. We are going to start with Xrays of your hands and feet to see if there are a lot of erosions in multiple sites and/or rapid progression of erosions. This will help us decide how aggressive to be with medications. Although you prefer to try natural remedies, use aleve 500 gm twice daily, and would like to see the xrays results before making a decision, ultimately you will likely need stronger treatment to prevent bone and joint damage. My recommendation for you is methotrexate. An alternative option is plaquenil. I am writing the options here so that you can look them up if you wish. Perhaps you know people on these therapies that would also be good to talk to. I am happy to continue to answer any questions as we go.    We will plan to keep your next clinic visit on December 16th but I will call you to review the Xray results before that.    In regards to your tick related testing, lyme screen was negative. Please follow up with the company or group that did the initial testing with any questions about lyme, babesia, or other tick born illness.     Dr. Goyal

## 2020-10-28 NOTE — PROGRESS NOTES
"Maida Menendez is a 54 year old female who is being evaluated via a billable video visit.      The patient has been notified of following:     \"This video visit will be conducted via a call between you and your physician/provider. We have found that certain health care needs can be provided without the need for an in-person physical exam.  This service lets us provide the care you need with a video conversation.  If a prescription is necessary we can send it directly to your pharmacy.  If lab work is needed we can place an order for that and you can then stop by our lab to have the test done at a later time.    Video visits are billed at different rates depending on your insurance coverage.  Please reach out to your insurance provider with any questions.    If during the course of the call the physician/provider feels a video visit is not appropriate, you will not be charged for this service.\"    Patient has given verbal consent for Video visit? Yes  How would you like to obtain your AVS? MyChart  If you are dropped from the video visit, the video invite should be resent to: Send to e-mail at: [email or web address]  Will anyone else be joining your video visit? No        Video-Visit Details    Type of service:  Video Visit    Video Start Time: 11:45  Video End Time: 12:30    Originating Location (pt. Location): Home    Distant Location (provider location):  [facility] [place]     Platform used for Video Visit: Pam Ngo MD        "

## 2020-10-28 NOTE — LETTER
"10/28/2020       RE: Maida Menendez  [id number] [address] [facility] [address]     Dear Colleague,    Thank you for referring your patient, Maida Menendez, to the [facility] [place] at [facility]. Please see a copy of my visit note below.    Maida Menendez is a 54 year old female who is being evaluated via a billable video visit.      The patient has been notified of following:     \"This video visit will be conducted via a call between you and your physician/provider. We have found that certain health care needs can be provided without the need for an in-person physical exam.  This service lets us provide the care you need with a video conversation.  If a prescription is necessary we can send it directly to your pharmacy.  If lab work is needed we can place an order for that and you can then stop by our lab to have the test done at a later time.    Video visits are billed at different rates depending on your insurance coverage.  Please reach out to your insurance provider with any questions.    If during the course of the call the physician/provider feels a video visit is not appropriate, you will not be charged for this service.\"    Patient has given verbal consent for Video visit? Yes  How would you like to obtain your AVS? MyChart  If you are dropped from the video visit, the video invite should be resent to: Send to e-mail at: [email or web address]  Will anyone else be joining your video visit? No        Video-Visit Details    Type of service:  Video Visit    Video Start Time: 11:45  Video End Time: 12:30    Originating Location (pt. 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 She was seen by Dr. Arrieta who aspirated her wrist but maida preferred to defer steroid injection. She continues to have pain at the base of both thumbs and sometimes a sharp feeling at the tip of there thumbs when in apposition. Otherwise she did have some benefit from hand therapy though the wrist splints. No recent raynaud's episodes.    Maida has had saliva and hair testing done through a third party company through a method that I am not familiar with. It apparently indicated that she has been exposed to lyme, babesia, and mycoplasma. I did test for Lyme disease and her lyme screen returned negative. We discussed these results briefly and that I am not an infectious disease expert nor able to interpret this test without more information. She will follow up with the company if she has further questions about their results.    ROS: 10 point ROS completed and negative except as noted per HPI    ALLERGIES: doxycycline, sulfa drugs     MEDS:  varicose vein stripping, sinus surgery, hysterectomy    PM/SHx: allergies, HLD    FAMHx: father had PMR that cause bilateral hand pain, both parents had cardiovascular disease    PHYSICAL EXAM  No vitals today  General: NAD, pleasant  HEENT: face symmetric, no telangiectasias, even facial expression, no obvious restriction of oral aperture  Resp: able to complete long sentences without dyspnea or pause  Skin: no rashes of face, upper arms, neck, chest  MSK: swollen appearance of left second finger at PIP but retains full flexion. Left wrist slightly limited in flexion  Psych: congruent mood and affect    MR left wrist without contrast 5/7/2020 2:23 PM                                                            1. Suspect complete tear of dorsal component of scapholunate  interosseous ligament and degenerative tear of membranous and volar  components.   2. Extensive marrow edema, joint effusion with internal debris, likely synovitis. Constellation of findings are most concerning for  underlying inflammatory arthritis. Alternatively reflecting multiple bone contusions though extent seems atypical for contusion.  3. Suspected tearing of the distal lamina of the triangular ligament.    XR LEFT hand 10/19/2020  1. High-grade degenerative changes of the first CMC and STT joints.Marginal erosions noted.  2. Overall osteopenic-appearing bones.  3. Soft tissue swelling second and third digit.  4. Combination of findings could be seen in rheumatoid arthritis, recommend clinical correlation.     RHEUM RESULTS Latest Ref Rng & Units 5/28/2020 10/13/2020   SED RATE 0 - 30 mm/h 45(H) -   CRP, INFLAMMATION 0.0 - 8.0 mg/L 7.0 4.5   RHEUMATOID FACTOR <12 IU/mL <7 -   WBC 4.0 - 11.0 10e9/L 8.9 -   RBC 3.8 - 5.2 10e12/L 4.20 -   HGB 11.7 - 15.7 g/dL 12.6 -   HCT 35.0 - 47.0 % 40.8 -   MCV 78 - 100 fl 97 -   MCHC 31.5 - 36.5 g/dL 30.9(L) -   RDW 10.0 - 15.0 % 13.5 -    - 450 10e9/L 255 -     Lab Results   Component Value Date/Time    JEFF Positive (A) 05/28/2020 12:44 PM    ANAP1 CENTROMERE 05/28/2020 12:44 PM    ANAT1 1:1,280 05/28/2020 12:44 PM    RHF <7 05/28/2020 12:44 PM    CCPIGG 1 05/28/2020 12:44 PM     10/19 Left wrist aspiration with negative cultures, PMNs on gram stain, no crystals    Infectious screens:  Lyme negative  No quantiferon, hepatitis, hiv screens to date    ASSESSMENT:   55 yo F with DANIELLE+ centromere+ LcSCC (CREST) with ongoing left wrist pain/swelling ~6 months and now with new swelling of left 2nd finger PIP of ~1 month. Overall picture is consistent with a progressive seronegative inflammatory arthritis which may be a part of or separate from her CREST diagnosis.     ## seronegative inflammatory arthritis  At this time, given presence of small erosions on imaging, I  have recommended starting methotrexate. However, Maida is not a big fan of taking medications and does not want to start this medication yet. We also discussed plaquenil and NSAIDs as alternative medications and that neither have bene shown to effectively prevent erosions and long term damage. Ultimately, we agreed to repeat xrays of her hands (so as to see both wrists/hands) and feet to look for evidence of progressive erosions. If she has many or more erosions than in previous imaging then that is a strong argument to push for methotrexate. For now, Maida will take aleve 500 mg BID for symptom control.    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 However, pulmonary artery hypertension can be a late manifestation of the disease and rather insidious at onset. Therapy for scleroderma at this stage is mainly supportive and Maida has no need for supportive therapies as this time. Nonetheless, she will require monitoring long term. We will need a baseline echo, pulmonary function test, and 6MW but, given COVID restrictions, will delay this for now.    PLAN:  1. Continue hand therapies as appropriate  2. XRs of hands & feet, will call to followup and we will make further treatment decisions from there    Staffed with Dr. Anselmo Ngo MD, PhD  Rheumatology Fellow    Staff addendum  I performed the history and physical examination of the patient and discussed the management with the fellow. I reviewed the available lab and imaging studies. I reviewed the fellow's note and agree with the documented findings and plan of care.    Ian Briones MD  Rheumatology

## 2020-10-29 ENCOUNTER — ANCILLARY PROCEDURE (OUTPATIENT)
Dept: GENERAL RADIOLOGY | Facility: CLINIC | Age: 55
End: 2020-10-29
Attending: STUDENT IN AN ORGANIZED HEALTH CARE EDUCATION/TRAINING PROGRAM
Payer: COMMERCIAL

## 2020-10-29 DIAGNOSIS — M06.042 RHEUMATOID ARTHRITIS INVOLVING LEFT HAND WITH NEGATIVE RHEUMATOID FACTOR (H): ICD-10-CM

## 2020-10-29 PROCEDURE — 73630 X-RAY EXAM OF FOOT: CPT | Mod: FY | Performed by: RADIOLOGY

## 2020-10-29 PROCEDURE — 73130 X-RAY EXAM OF HAND: CPT | Mod: FY | Performed by: RADIOLOGY

## 2020-10-29 PROCEDURE — 73110 X-RAY EXAM OF WRIST: CPT | Mod: FY | Performed by: RADIOLOGY

## 2020-11-03 ENCOUNTER — DOCUMENTATION ONLY (OUTPATIENT)
Dept: CARE COORDINATION | Facility: CLINIC | Age: 55
End: 2020-11-03

## 2020-11-03 LAB
BACTERIA SPEC CULT: NORMAL
Lab: NORMAL
SPECIMEN SOURCE: NORMAL

## 2020-11-04 ENCOUNTER — VIRTUAL VISIT (OUTPATIENT)
Dept: RHEUMATOLOGY | Facility: CLINIC | Age: 55
End: 2020-11-04
Attending: STUDENT IN AN ORGANIZED HEALTH CARE EDUCATION/TRAINING PROGRAM
Payer: COMMERCIAL

## 2020-11-04 DIAGNOSIS — M19.90 INFLAMMATORY ARTHRITIS: ICD-10-CM

## 2020-11-04 DIAGNOSIS — M34.1 CREST SYNDROME (H): Primary | ICD-10-CM

## 2020-11-04 PROCEDURE — 99214 OFFICE O/P EST MOD 30 MIN: CPT | Mod: GC | Performed by: STUDENT IN AN ORGANIZED HEALTH CARE EDUCATION/TRAINING PROGRAM

## 2020-11-04 NOTE — LETTER
11/4/2020       RE: Maida Menendez  [address] [facility] [address]     Dear Colleague,    Thank you for referring your patient, Maida Menendez, to the [facility] RHEUMATOLOGY [facility] [place] at [facility]. Please see a copy of my visit note below.    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 Since last visit, Maida has been taking 220 mg naproxen twice a day. Her left middle finger PIP swelling is resolving. We discussed her Xray screens which did not show profuse evidence of erosions at multiple joints making it unlikely that she has aggressive or longstanding cryptic inflammatory arthritis. We discussed treatment options for her inflammatory arthritis which included: steroid injections to wrist and PIP OR starting a burst & taper of prednisone oral OR starting daily plaquenil. Maida strongly prefers to avoid all these options at this time. She prefers more natural remedies and is happy with NSAID therapy. She understands that should she have additional joint involvement or worsening symptoms she may need more aggressive therapy. We discussed that prednisone and nsaid therapy do not prevent erosions long term. We discussed that her physical therapy might work better if we combine it with anti-inflammatory therapy such as a prednisone burst/taper or steroid injections. She would still prefer to try something dietary or herbal. We therefore discussed tumeric as mildly anti-inflammatory. As she also does not want to take medications for high cholesterol and does have, per her report, high cholesterol, and tumeric in some studies has shown evidence of lowering LDL perhaps this is a good option for her.    Maida also notes a lot of thumb pain at the bases and between her  wrist and CMCs. We reviewed her Xrays which show degenerative disease at the CMC and worst on the left which corresponds to worst pain and clicking on the left. We discussed risk reduction strategies in terms of cell phone use such as using dictation feature and Marielena more often. We discussed that her thumb pain may be multifactorial and though largely degenerative arthritis it may be worst now in the setting of overlapping inflammatory arthritis. We discussed how an anti-inflammatory medication therapy would help any inflammatory component but will not benefit the degenerative arthritis.    Finally, we discussed the CREST screening studies that may now be able to be obtained as the [facility] has opened up much of its services and PFTs are again available.    ROS: 10 point ROS completed and negative except as noted per HPI    ALLERGIES: doxycycline, sulfa drugs     MEDS: none    PM/SHx: allergies, HLD, varicose vein stripping, sinus surgery, hysterectomy    FAMHx: father had PMR that cause bilateral hand pain, both parents had cardiovascular disease    PHYSICAL EXAM  No vitals today  General: NAD, pleasant  HEENT: face symmetric, no telangiectasias, even facial expression, no obvious restriction of oral aperture  Resp: able to complete long sentences without dyspnea or pause  Skin: no rashes of face, upper arms, neck, chest  MSK: resolving swelling appearance of left second finger at PIP with ability to fully flex at the joint. Left wrist slightly limited in flexion/extension  Psych: congruent mood and affect    MR left wrist without contrast 5/7/2020 2:23 PM                                                            1. Suspect complete tear of dorsal component of scapholunate interosseous ligament and degenerative tear of membranous and volar  components.   2. Extensive marrow edema, joint effusion with internal debris, likely synovitis. Constellation of findings are most concerning for  underlying inflammatory arthritis.  Alternatively reflecting multiple bone contusions though extent seems atypical for contusion.  3. Suspected tearing of the distal lamina of the triangular ligament.    XRs of wrists, hands, feet from 10/29/2020 personally reviewed and reviewed with patient    RHEUM RESULTS Latest Ref Rng & Units 5/28/2020 10/13/2020   SED RATE 0 - 30 mm/h 45(H) -   CRP, INFLAMMATION 0.0 - 8.0 mg/L 7.0 4.5   RHEUMATOID FACTOR <12 IU/mL <7 -   WBC 4.0 - 11.0 10e9/L 8.9 -   RBC 3.8 - 5.2 10e12/L 4.20 -   HGB 11.7 - 15.7 g/dL 12.6 -   HCT 35.0 - 47.0 % 40.8 -   MCV 78 - 100 fl 97 -   MCHC 31.5 - 36.5 g/dL 30.9(L) -   RDW 10.0 - 15.0 % 13.5 -    - 450 10e9/L 255 -     Lab Results   Component Value Date/Time    JEFF Positive (A) 05/28/2020 12:44 PM    ANAP1 CENTROMERE 05/28/2020 12:44 PM    ANAT1 1:1,280 05/28/2020 12:44 PM    RHF <7 05/28/2020 12:44 PM    CCPIGG 1 05/28/2020 12:44 PM     10/19 Left wrist aspiration with negative cultures, PMNs on gram stain, no crystals    Infectious screens:  Lyme negative  No quantiferon, hepatitis, hiv screens to date    ASSESSMENT:   53 yo F with DANIELLE+ centromere+ LcSCC (CREST) with ongoing left wrist pain/swelling >6 months and now with new swelling of left 2nd finger PIP of >1 month. Overall picture is consistent with a progressive seronegative inflammatory arthritis which may be a part of or separate from her CREST diagnosis.     ## seronegative inflammatory arthritis  At this time, given presence of small erosions on imaging, and progressive joint involvement, but no other evidence of inflammatory arthritis on screening xrays, I have recommended starting plaquenil versus burst/taper of prednisone versus steroid injections. However, Maida is not a big fan of taking medications and does not want to start this medication yet. She is willing to continue NSAIDs and I have recommended that she increase her dose of naprozen to 440-500 mg twice daily, an anti-inflammatory dose. We also discussed  starting 1000 - 1500 mg of tumeric daily and taking that every day for 1 month to see if it is helpful. Maida will let me know how she does with tumeric.  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 However, pulmonary artery hypertension can be a late manifestation of the disease and rather insidious at onset. Therapy for scleroderma at this stage is mainly supportive and Maida has no need for supportive therapies as this time. Nonetheless, she will require monitoring long term. We will need a baseline echo, pulmonary function test, and 6MW.  -- I have ordered all the above today    Mychart in 1 month  RTC in 6 months, sooner as needed  Staffed with Dr. Marva Ngo MD, PhD  Rheumatology Fellow    Attending Note: I saw and evaluated the patient with Dr. Ngo. I agree with the assessment and plan.    Tretnon Durham MD    Orders Placed This Encounter   Procedures     CT Chest w/o contrast (High Resolution)     General PFT Lab (Please always keep checked)     6 minute walk test     Pulmonary Function Test

## 2020-11-04 NOTE — PROGRESS NOTES
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 Since last visit, Maida has been taking 220 mg naproxen twice a day. Her left middle finger PIP swelling is resolving. We discussed her Xray screens which did not show profuse evidence of erosions at multiple joints making it unlikely that she has aggressive or longstanding cryptic inflammatory arthritis. We discussed treatment options for her inflammatory arthritis which included: steroid injections to wrist and PIP OR starting a burst & taper of prednisone oral OR starting daily plaquenil. Maida strongly prefers to avoid all these options at this time. She prefers more natural remedies and is happy with NSAID therapy. She understands that should she have additional joint involvement or worsening symptoms she may need more aggressive therapy. We discussed that prednisone and nsaid therapy do not prevent erosions long term. We discussed that her physical therapy might work better if we combine it with anti-inflammatory therapy such as a prednisone burst/taper or steroid injections. She would still prefer to try something dietary or herbal. We therefore discussed tumeric as mildly anti-inflammatory. As she also does not want to take medications for high cholesterol and does have, per her report, high cholesterol, and tumeric in some studies has shown evidence of lowering LDL perhaps this is a good option for her.    Maida also notes a lot of thumb pain at the bases and between her wrist and CMCs. We reviewed her Xrays which show degenerative disease at the CMC and worst on the left which corresponds to worst pain and clicking on the left. We discussed risk reduction strategies in terms of cell phone use such as using dictation feature and Marielena more often. We discussed that her thumb  pain may be multifactorial and though largely degenerative arthritis it may be worst now in the setting of overlapping inflammatory arthritis. We discussed how an anti-inflammatory medication therapy would help any inflammatory component but will not benefit the degenerative arthritis.    Finally, we discussed the CREST screening studies that may now be able to be obtained as the [facility] [place] much of its services and PFTs are again available.    ROS: 10 point ROS completed and negative except as noted per HPI    ALLERGIES: doxycycline, sulfa drugs     MEDS: none    PM/SHx: allergies, HLD, varicose vein stripping, sinus surgery, hysterectomy    FAMHx: father had PMR that cause bilateral hand pain, both parents had cardiovascular disease    PHYSICAL EXAM  No vitals today  General: NAD, pleasant  HEENT: face symmetric, no telangiectasias, even facial expression, no obvious restriction of oral aperture  Resp: able to complete long sentences without dyspnea or pause  Skin: no rashes of face, upper arms, neck, chest  MSK: resolving swelling appearance of left second finger at PIP with ability to fully flex at the joint. Left wrist slightly limited in flexion/extension  Psych: congruent mood and affect    MR left wrist without contrast 5/7/2020 2:23 PM                                                            1. Suspect complete tear of dorsal component of scapholunate interosseous ligament and degenerative tear of membranous and volar  components.   2. Extensive marrow edema, joint effusion with internal debris, likely synovitis. Constellation of findings are most concerning for  underlying inflammatory arthritis. Alternatively reflecting multiple bone contusions though extent seems atypical for contusion.  3. Suspected tearing of the distal lamina of the triangular ligament.    XRs of wrists, hands, feet from 10/29/2020 personally reviewed and reviewed with patient    RHEUM RESULTS Latest Ref Rng & Units 5/28/2020  10/13/2020   SED RATE 0 - 30 mm/h 45(H) -   CRP, INFLAMMATION 0.0 - 8.0 mg/L 7.0 4.5   RHEUMATOID FACTOR <12 IU/mL <7 -   WBC 4.0 - 11.0 10e9/L 8.9 -   RBC 3.8 - 5.2 10e12/L 4.20 -   HGB 11.7 - 15.7 g/dL 12.6 -   HCT 35.0 - 47.0 % 40.8 -   MCV 78 - 100 fl 97 -   MCHC 31.5 - 36.5 g/dL 30.9(L) -   RDW 10.0 - 15.0 % 13.5 -    - 450 10e9/L 255 -     Lab Results   Component Value Date/Time    JEFF Positive (A) 05/28/2020 12:44 PM    ANAP1 CENTROMERE 05/28/2020 12:44 PM    ANAT1 1:1,280 05/28/2020 12:44 PM    RHF <7 05/28/2020 12:44 PM    CCPIGG 1 05/28/2020 12:44 PM     10/19 Left wrist aspiration with negative cultures, PMNs on gram stain, no crystals    Infectious screens:  Lyme negative  No quantiferon, hepatitis, hiv screens to date    ASSESSMENT:   55 yo F with DANIELLE+ centromere+ LcSCC (CREST) with ongoing left wrist pain/swelling >6 months and now with new swelling of left 2nd finger PIP of >1 month. Overall picture is consistent with a progressive seronegative inflammatory arthritis which may be a part of or separate from her CREST diagnosis.     ## seronegative inflammatory arthritis  At this time, given presence of small erosions on imaging, and progressive joint involvement, but no other evidence of inflammatory arthritis on screening xrays, I have recommended starting plaquenil versus burst/taper of prednisone versus steroid injections. However, Maida is not a big fan of taking medications and does not want to start this medication yet. She is willing to continue NSAIDs and I have recommended that she increase her dose of naprozen to 440-500 mg twice daily, an anti-inflammatory dose. We also discussed starting 1000 - 1500 mg of tumeric daily and taking that every day for 1 month to see if it is helpful. Maida will let me know how she does with tumeric.  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 However, pulmonary artery hypertension can be a late manifestation of the disease and rather insidious at onset. Therapy for scleroderma at this stage is mainly supportive and Maida has no need for supportive therapies as this time. Nonetheless, she will require monitoring long term. We will need a baseline echo, pulmonary function test, and 6MW.  -- I have ordered all the above today    Mychart in 1 month  RTC in 6 months, sooner as needed  Staffed with Dr. Marva Ngo MD, PhD  Rheumatology Fellow    Attending Note: I saw and evaluated the patient with Dr. Ngo. I agree with the assessment and plan.    Trenton Durham MD    Orders Placed This Encounter   Procedures     CT Chest w/o contrast (High Resolution)     General PFT Lab (Please always keep checked)     6 minute walk test     Pulmonary Function Test

## 2020-11-04 NOTE — PATIENT INSTRUCTIONS
For your inflammatory arthritis, we will hold off starting plaquenil, prednisone, or injections for now so that you can give tumeric a try. Ok to keep taking naproxen (aleve) 500 mg twice a day (or if you have 200 mg pills to take 400 mg twice a day).     For tumeric, this article has a good and eye-wide-open summary of the evidence for tumeric: https://www.the-rheumatologist.org/article/turmeric-the-evidence-for-therapeutic-use-for-arthritis/    Please try 1000 to 2000 mg of tumeric/curcumin (~5-10 teaspoons of tumeric spice) every day for 30 days. See if you can find a supplement that has a seal from U.S. Pharmacopeia (USP), [employer], [employer] or [employer] as these organizations are sometimes used to make sure there are no other additives in the tumeric supplements (such as lead).    Please send me a message in 1 month to let me know how the tumeric does. Let me know sooner if you have any more swollen joints.    For your CREST screenings, I have ordered a CT chest, a pulmonary function test (PFT), and cardiac echo. These are non-urgent and can be scheduled at your convenience over the next several months. Preferably, the CT chest and PFTs are done around the same time.    Let's plan for a followup visit in 6 months. Sooner if you have new swelling or worsening symptoms.  Dr. Goyal

## 2020-12-08 ENCOUNTER — MYC MEDICAL ADVICE (OUTPATIENT)
Dept: RHEUMATOLOGY | Facility: CLINIC | Age: 55
End: 2020-12-08

## 2020-12-16 ENCOUNTER — VIRTUAL VISIT (OUTPATIENT)
Dept: RHEUMATOLOGY | Facility: CLINIC | Age: 55
End: 2020-12-16
Attending: STUDENT IN AN ORGANIZED HEALTH CARE EDUCATION/TRAINING PROGRAM
Payer: COMMERCIAL

## 2020-12-16 VITALS — HEIGHT: 65 IN | BODY MASS INDEX: 22.49 KG/M2 | WEIGHT: 135 LBS

## 2020-12-16 DIAGNOSIS — M34.9 SYSTEMIC SCLEROSIS WITH LIMITED CUTANEOUS INVOLVEMENT (H): Chronic | ICD-10-CM

## 2020-12-16 DIAGNOSIS — M06.09 SERONEGATIVE RHEUMATOID ARTHRITIS OF MULTIPLE SITES (H): Primary | ICD-10-CM

## 2020-12-16 PROCEDURE — 99214 OFFICE O/P EST MOD 30 MIN: CPT | Mod: 95 | Performed by: STUDENT IN AN ORGANIZED HEALTH CARE EDUCATION/TRAINING PROGRAM

## 2020-12-16 ASSESSMENT — PAIN SCALES - GENERAL: PAINLEVEL: MODERATE PAIN (4)

## 2020-12-16 ASSESSMENT — MIFFLIN-ST. JEOR: SCORE: 1198.24

## 2020-12-16 NOTE — PROGRESS NOTES
Rheumatology Attending:    This patient was interviewed and examined in the presence of the medical fellow, and this note reflects our mutual impression. My additional thoughts are as follows:    Centromere-positive LcSSc with destructive arthritis reminiscent of rheumatoid arthritis. In my opinion, DMARD therapy with methotrexate is indicated here to improve symptoms and protect against future damage, but the patient is reluctant to start a DMARD.    Malcolm Disla MD  Professor of Medicine  Director, Division of Rheumatic and Autoimmune Diseases

## 2020-12-16 NOTE — LETTER
12/16/2020       RE: Maida Menendez  [address] [facility] [address]     Dear Colleague,    Thank you for referring your patient, Maida Menendez, to the [facility] RHEUMATOLOGY CLINIC [place] at [facility]. Please see a copy of my visit note below.    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 Since last visit, Maida did take naproxen twice a day for while and took tumeric in liquid form for some time as well. Her wrist swelling has gone away, her middle finger continues to get stiff at night still and sometimes gets red and itchy at the sides. Her wrist feels stronger, she can twist off bottle caps again but still cannot hold significant weight on that wrist. We discussed the degree of change in her wrist as concerning for inflammatory arthritis and encouraged consideration of starting methotrexate.    ROS: 10 point ROS completed and negative except as noted per HPI    ALLERGIES: doxycycline, sulfa drugs     MEDS: none    PM/SHx: allergies, HLD, varicose vein stripping, sinus surgery, hysterectomy    FAMHx: father had PMR that cause bilateral hand pain, both parents had cardiovascular disease    PHYSICAL EXAM  No vitals today  General: NAD, pleasant  HEENT: face symmetric, no telangiectasias, even facial expression, no obvious restriction of oral aperture  Resp: able to complete long sentences without dyspnea or pause  Skin: no rashes of face, upper arms, neck, chest  MSK: resolving swelling appearance of left second finger at PIP with ability to fully flex at the joint. Left wrist slightly limited in flexion/extension  Psych: congruent mood and affect    MR left wrist without contrast 5/7/2020 2:23 PM                                                            1.  Suspect complete tear of dorsal component of scapholunate interosseous ligament and degenerative tear of membranous and volar components.   2. Extensive marrow edema, joint effusion with internal debris, likely synovitis. Constellation of findings are most concerning for underlying inflammatory arthritis. Alternatively reflecting multiple bone contusions though extent seems atypical for contusion.  3. Suspected tearing of the distal lamina of the triangular ligament.    XRs of wrists, hands, feet from 10/29/2020 personally reviewed and reviewed with patient    RHEUM RESULTS Latest Ref Rng & Units 5/28/2020 10/13/2020   SED RATE 0 - 30 mm/h 45(H) -   CRP, INFLAMMATION 0.0 - 8.0 mg/L 7.0 4.5   RHEUMATOID FACTOR <12 IU/mL <7 -   WBC 4.0 - 11.0 10e9/L 8.9 -   RBC 3.8 - 5.2 10e12/L 4.20 -   HGB 11.7 - 15.7 g/dL 12.6 -   HCT 35.0 - 47.0 % 40.8 -   MCV 78 - 100 fl 97 -   MCHC 31.5 - 36.5 g/dL 30.9(L) -   RDW 10.0 - 15.0 % 13.5 -    - 450 10e9/L 255 -     Lab Results   Component Value Date/Time    JEFF Positive (A) 05/28/2020 12:44 PM    ANAP1 CENTROMERE 05/28/2020 12:44 PM    ANAT1 1:1,280 05/28/2020 12:44 PM    RHF <7 05/28/2020 12:44 PM    CCPIGG 1 05/28/2020 12:44 PM     10/19 Left wrist aspiration with negative cultures, PMNs on gram stain, no crystals  10/19 Left wrist cultures with no growth, no anaerobes    Infectious screens:  Lyme negative  No quantiferon, hepatitis, hiv screens to date    ASSESSMENT:   53 yo F with DANIELLE+ centromere+ LcSCC (CREST) with ongoing left wrist pain/swelling >6 months who subsequently developed swelling of left 2nd finger PIP of >1 month. Overall picture is consistent with a progressive seronegative inflammatory arthritis which may be a part of or separate from her CREST diagnosis.     ## seronegative inflammatory arthritis  At this time, given presence of small erosions on imaging, degree of list involvement, and progressive joint involvement, but no other evidence of inflammatory  arthritis on screening xrays, I have recommended starting plaquenil versus burst/taper of prednisone versus steroid injections versus methotrexate. However, Maida is not a big fan of taking medications and does not want to start this medication yet. She did see some improvement with naproxen to 440-500 mg twice daily but ultimately did not want to continue it. She is more comfortable taking 8003-2389 mg tumeric daily though she would like to switch from liquid to capsule form.  -- continue 1000 - 1500 mg tumeric daily  -- naproxen BID PRN  -- would like Maida to return to hand therapy once covid restriction are lighter    ## limited systemic sclerosis (CREST) w/raynauds and periungal capillary dropout  Maida appears to have a very benign presentation of CREST with little if any permanent skin changes. However, pulmonary artery hypertension can be a late manifestation of the disease and rather insidious at onset. Therapy for scleroderma at this stage is mainly supportive and Maida has no need for supportive therapies as this time. Nonetheless, she will require monitoring long term. We will need a baseline echo, pulmonary function test, and 6MW.  -- All monitoring evaluations are ordered and pending    RTC 3 months  Staffed with Dr. Nighat Ngo MD, PhD  Rheumatology Fellow      Rheumatology Attending:    This patient was interviewed and examined in the presence of the medical fellow, and this note reflects our mutual impression. My additional thoughts are as follows:    Centromere-positive LcSSc with destructive arthritis reminiscent of rheumatoid arthritis. In my opinion, DMARD therapy with methotrexate is indicated here to improve symptoms and protect against future damage, but the patient is reluctant to start a DMARD.    Malcolm Disla MD  Professor of Medicine  Director, Division of Rheumatic and Autoimmune Diseases        Chief Complaint   Patient presents with     RECHECK     6 MONTHS  "    Height 1.651 m (5' 5\"), weight 61.2 kg (135 lb).  Maida Menendez is a 55 year old female who is being evaluated via a billable video visit.      The patient has been notified of following:     \"This video visit will be conducted via a call between you and your physician/provider. We have found that certain health care needs can be provided without the need for an in-person physical exam.  This service lets us provide the care you need with a video conversation.  If a prescription is necessary we can send it directly to your pharmacy.  If lab work is needed we can place an order for that and you can then stop by our lab to have the test done at a later time.    Video visits are billed at different rates depending on your insurance coverage.  Please reach out to your insurance provider with any questions.    If during the course of the call the physician/provider feels a video visit is not appropriate, you will not be charged for this service.\"    Patient has given verbal consent for Video visit? Yes  How would you like to obtain your AVS? [employer]  If you are dropped from the video visit, the video invite should be resent to: Other e-mail: [email or web address]- PATIENT IN THE WAITING ROOM  Will anyone else be joining your video visit? No        Video-Visit Details    Type of service:  Video Visit    Video Start Time: 10:30 AM  Video End Time: 11:00 AM    Originating Location (pt. Location): Home    Distant Location (provider location):  [facility] RHEUMATOLOGY CLINIC [place]     Platform used for Video Visit: Pam Ngo MD      "

## 2020-12-16 NOTE — PATIENT INSTRUCTIONS
Glad you are slowly improving.  Continue to take tumeric since that appears to really help you.    Let's see each other again in 3 months.    Have a happy holiday!    Dr. Goyal

## 2020-12-16 NOTE — PROGRESS NOTES
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 Since last visit, Maida did take naproxen twice a day for while and took tumeric in liquid form for some time as well. Her wrist swelling has gone away, her middle finger continues to get stiff at night still and sometimes gets red and itchy at the sides. Her wrist feels stronger, she can twist off bottle caps again but still cannot hold significant weight on that wrist. We discussed the degree of change in her wrist as concerning for inflammatory arthritis and encouraged consideration of starting methotrexate.    ROS: 10 point ROS completed and negative except as noted per HPI    ALLERGIES: doxycycline, sulfa drugs     MEDS: none    PM/SHx: allergies, HLD, varicose vein stripping, sinus surgery, hysterectomy    FAMHx: father had PMR that cause bilateral hand pain, both parents had cardiovascular disease    PHYSICAL EXAM  No vitals today  General: NAD, pleasant  HEENT: face symmetric, no telangiectasias, even facial expression, no obvious restriction of oral aperture  Resp: able to complete long sentences without dyspnea or pause  Skin: no rashes of face, upper arms, neck, chest  MSK: resolving swelling appearance of left second finger at PIP with ability to fully flex at the joint. Left wrist slightly limited in flexion/extension  Psych: congruent mood and affect    MR left wrist without contrast 5/7/2020 2:23 PM                                                            1. Suspect complete tear of dorsal component of scapholunate interosseous ligament and degenerative tear of membranous and volar components.   2. Extensive marrow edema, joint effusion with internal debris, likely synovitis. Constellation of findings are most concerning for underlying inflammatory arthritis.  Alternatively reflecting multiple bone contusions though extent seems atypical for contusion.  3. Suspected tearing of the distal lamina of the triangular ligament.    XRs of wrists, hands, feet from 10/29/2020 personally reviewed and reviewed with patient    RHEUM RESULTS Latest Ref Rng & Units 5/28/2020 10/13/2020   SED RATE 0 - 30 mm/h 45(H) -   CRP, INFLAMMATION 0.0 - 8.0 mg/L 7.0 4.5   RHEUMATOID FACTOR <12 IU/mL <7 -   WBC 4.0 - 11.0 10e9/L 8.9 -   RBC 3.8 - 5.2 10e12/L 4.20 -   HGB 11.7 - 15.7 g/dL 12.6 -   HCT 35.0 - 47.0 % 40.8 -   MCV 78 - 100 fl 97 -   MCHC 31.5 - 36.5 g/dL 30.9(L) -   RDW 10.0 - 15.0 % 13.5 -    - 450 10e9/L 255 -     Lab Results   Component Value Date/Time    JEFF Positive (A) 05/28/2020 12:44 PM    ANAP1 CENTROMERE 05/28/2020 12:44 PM    ANAT1 1:1,280 05/28/2020 12:44 PM    RHF <7 05/28/2020 12:44 PM    CCPIGG 1 05/28/2020 12:44 PM     10/19 Left wrist aspiration with negative cultures, PMNs on gram stain, no crystals  10/19 Left wrist cultures with no growth, no anaerobes    Infectious screens:  Lyme negative  No quantiferon, hepatitis, hiv screens to date    ASSESSMENT:   53 yo F with DANIELLE+ centromere+ LcSCC (CREST) with ongoing left wrist pain/swelling >6 months who subsequently developed swelling of left 2nd finger PIP of >1 month. Overall picture is consistent with a progressive seronegative inflammatory arthritis which may be a part of or separate from her CREST diagnosis.     ## seronegative inflammatory arthritis  At this time, given presence of small erosions on imaging, degree of list involvement, and progressive joint involvement, but no other evidence of inflammatory arthritis on screening xrays, I have recommended starting plaquenil versus burst/taper of prednisone versus steroid injections versus methotrexate. However, Maida is not a big fan of taking medications and does not want to start this medication yet. She did see some improvement with naproxen to 440-500 mg  twice daily but ultimately did not want to continue it. She is more comfortable taking 9180-5355 mg tumeric daily though she would like to switch from liquid to capsule form.  -- continue 1000 - 1500 mg tumeric daily  -- naproxen BID PRN  -- would like Maida to return to hand therapy once covid restriction are lighter    ## limited systemic sclerosis (CREST) w/raynauds and periungal capillary dropout  Maida appears to have a very benign presentation of CREST with little if any permanent skin changes. However, pulmonary artery hypertension can be a late manifestation of the disease and rather insidious at onset. Therapy for scleroderma at this stage is mainly supportive and Maida has no need for supportive therapies as this time. Nonetheless, she will require monitoring long term. We will need a baseline echo, pulmonary function test, and 6MW.  -- All monitoring evaluations are ordered and pending    RTC 3 months  Staffed with Dr. Nighat Ngo MD, PhD  Rheumatology Fellow

## 2020-12-19 NOTE — PROGRESS NOTES
"Chief Complaint   Patient presents with     RECHECK     6 MONTHS     Height 1.651 m (5' 5\"), weight 61.2 kg (135 lb).  Maida Menendez is a 55 year old female who is being evaluated via a billable video visit.      The patient has been notified of following:     \"This video visit will be conducted via a call between you and your physician/provider. We have found that certain health care needs can be provided without the need for an in-person physical exam.  This service lets us provide the care you need with a video conversation.  If a prescription is necessary we can send it directly to your pharmacy.  If lab work is needed we can place an order for that and you can then stop by our lab to have the test done at a later time.    Video visits are billed at different rates depending on your insurance coverage.  Please reach out to your insurance provider with any questions.    If during the course of the call the physician/provider feels a video visit is not appropriate, you will not be charged for this service.\"    Patient has given verbal consent for Video visit? Yes  How would you like to obtain your AVS? MyChart  If you are dropped from the video visit, the video invite should be resent to: Other e-mail: USE [facility]- PATIENT IN THE WAITING ROOM  Will anyone else be joining your video visit? No        Video-Visit Details    Type of service:  Video Visit    Video Start Time: 10:30 AM  Video End Time: 11:00 AM    Originating Location (pt. Location): Home    Distant Location (provider location):  [facility]HE[facility]OLOGY [facility] [place]     Platform used for Video Visit: Pam Ngo MD      "

## 2020-12-20 PROBLEM — Z79.899 LONG-TERM USE OF HIGH-RISK MEDICATION: Status: ACTIVE | Noted: 2020-12-20

## 2020-12-20 PROBLEM — Z79.899 LONG-TERM USE OF HIGH-RISK MEDICATION: Status: RESOLVED | Noted: 2020-12-20 | Resolved: 2020-12-20

## 2020-12-22 ENCOUNTER — TELEPHONE (OUTPATIENT)
Dept: RHEUMATOLOGY | Facility: CLINIC | Age: 55
End: 2020-12-22

## 2021-01-04 ENCOUNTER — HEALTH MAINTENANCE LETTER (OUTPATIENT)
Age: 56
End: 2021-01-04

## 2021-10-10 ENCOUNTER — HEALTH MAINTENANCE LETTER (OUTPATIENT)
Age: 56
End: 2021-10-10

## 2022-02-04 NOTE — PROGRESS NOTES
Assessment & Plan     Advance care planning      Facial droop    - MR Brain w/o & w Contrast; Future    Chronic cough    - XR Chest 2 Views; Future    Encounter for screening mammogram for breast cancer    - *MA Screening Digital Bilateral; Future    Lipid screening    - Lipid panel reflex to direct LDL Fasting; Future    Adhesive capsulitis of left shoulder    - Physical Therapy Referral; Future    History of vitamin D deficiency    - Renal panel (Alb, BUN, Ca, Cl, CO2, Creat, Gluc, Phos, K, Na); Future  - Vitamin D Deficiency; Future  - Vitamin B12; Future  - Vitamin A; Future  - Vitamin C; Future  - Vitamin E; Future  - Vitamin K; Future    Menopause    - Estradiol; Future    Screening for thyroid disorder    - TSH with free T4 reflex; Future    30 minutes spent on the date of the encounter doing chart review, history and exam, documentation and further activities per the note     See Patient Instructions: we will let her know imaging results and if abnormal a treatment plan.  Schedule lab appointment. Follow up if worsening symptoms.     Return in about 4 weeks (around 3/10/2022), or if symptoms worsen or fail to improve.    VIRGILIO Russell  [facility] ADEOLA Bey is a 56 year old who presents for the following health issues     Patient is here to establish care  Would like to discuss  1. Weakness on left side of face, drooling, speech issues    History of Present Illness       Hyperlipidemia:  She presents for follow up of hyperlipidemia.  She is not taking medication to lower cholesterol. She is not having myalgia or other side effects to statin medications.    Vascular Disease:  She presents for follow up of vascular disease.  She never takes nitroglycerin. She is not taking daily aspirin.    She eats 2-3 servings of fruits and vegetables daily.She consumes 1 sweetened beverage(s) daily.She exercises with enough effort to increase her heart rate 9 or less minutes per day.   "She exercises with enough effort to increase her heart rate 3 or less days per week.      \"Need a new dr. mine left. I have a weakness on the left side of my face by my   lip. Causes some drooling and speech issue. Comes and goes. Need to get updated   blood tests done.\"    She reports L sided mouth /face weakness for about 3 weeks. Not present all the time. Causes trouble speaking/ drooling. Sometimes blurry vision as well- went to the eye doctor and vision was ok.   Has had high cholesterol since her early 20's. She is not big on medications, so she found a cardiologist who helped her get it lowered with a special diet- oatmeal, breakfast bars loaded with omega 3's.  However she slowly moved away from that diet due to gluten sensitivity/ lactose intolerance.  Her Dad did have TIA's before he passed away.     She reports L sided frozen shoulder, did have it on the right, as well as L hand pain, swelling- xray showed tear.  Was told by rheumatologist to try tumeric 1,600mg can help.  She has started collagen 10-20 mg daily and this has helped her finder, wrist pain.     She reports a chronic cough for > 2 years.  Is not taking any medications where this would be a side effect.  Non-smoker.  She reports her chest feels tight.     She would anuradha to come back for fasting labs, vitamin levels, estrogen and candida.     Review of Systems   Constitutional, HEENT, cardiovascular, pulmonary, GI, , musculoskeletal, neuro, skin, endocrine and psych systems are negative, except as otherwise noted.      Objective    /72   Pulse 61   Temp 98.3  F (36.8  C) (Tympanic)   Resp 16   Ht 1.653 m (5' 5.08\")   Wt 62.7 kg (138 lb 3.2 oz)   SpO2 98%   BMI 22.94 kg/m    Body mass index is 22.94 kg/m .  Physical Exam   GENERAL: healthy, alert and no distress  EYES: Eyes grossly normal to inspection, PERRL and conjunctivae and sclerae normal  RESP: lungs clear to auscultation - no rales, rhonchi or wheezes  CV: regular rate and " rhythm, normal S1 S2, no S3 or S4, no murmur, click or rub, no peripheral edema and peripheral pulses strong  MS: no gross musculoskeletal defects noted, no edema POSITIVE limited ROM left shoulder with abduction.   NEURO: POSITIVE for left facial droop around mouth, otherwise-normal strength and tone, mentation intact and speech normal  PSYCH: mentation appears normal, affect normal/bright    See orders

## 2022-02-10 ENCOUNTER — TELEPHONE (OUTPATIENT)
Dept: FAMILY MEDICINE | Facility: CLINIC | Age: 57
End: 2022-02-10

## 2022-02-10 ENCOUNTER — OFFICE VISIT (OUTPATIENT)
Dept: FAMILY MEDICINE | Facility: CLINIC | Age: 57
End: 2022-02-10
Payer: COMMERCIAL

## 2022-02-10 ENCOUNTER — ANCILLARY PROCEDURE (OUTPATIENT)
Dept: GENERAL RADIOLOGY | Facility: CLINIC | Age: 57
End: 2022-02-10
Attending: NURSE PRACTITIONER
Payer: COMMERCIAL

## 2022-02-10 ENCOUNTER — MYC MEDICAL ADVICE (OUTPATIENT)
Dept: FAMILY MEDICINE | Facility: CLINIC | Age: 57
End: 2022-02-10

## 2022-02-10 VITALS
OXYGEN SATURATION: 98 % | BODY MASS INDEX: 23.03 KG/M2 | WEIGHT: 138.2 LBS | HEART RATE: 61 BPM | RESPIRATION RATE: 16 BRPM | DIASTOLIC BLOOD PRESSURE: 72 MMHG | HEIGHT: 65 IN | SYSTOLIC BLOOD PRESSURE: 114 MMHG | TEMPERATURE: 98.3 F

## 2022-02-10 DIAGNOSIS — Z78.0 MENOPAUSE: ICD-10-CM

## 2022-02-10 DIAGNOSIS — R05.3 CHRONIC COUGH: ICD-10-CM

## 2022-02-10 DIAGNOSIS — M40.00 KYPHOSIS (ACQUIRED) (POSTURAL): Primary | ICD-10-CM

## 2022-02-10 DIAGNOSIS — Z12.31 ENCOUNTER FOR SCREENING MAMMOGRAM FOR BREAST CANCER: ICD-10-CM

## 2022-02-10 DIAGNOSIS — Z13.29 SCREENING FOR THYROID DISORDER: ICD-10-CM

## 2022-02-10 DIAGNOSIS — Z86.39 HISTORY OF VITAMIN D DEFICIENCY: ICD-10-CM

## 2022-02-10 DIAGNOSIS — M75.02 ADHESIVE CAPSULITIS OF LEFT SHOULDER: ICD-10-CM

## 2022-02-10 DIAGNOSIS — Z71.89 ADVANCE CARE PLANNING: Primary | ICD-10-CM

## 2022-02-10 DIAGNOSIS — Z13.220 LIPID SCREENING: ICD-10-CM

## 2022-02-10 DIAGNOSIS — R29.810 FACIAL DROOP: ICD-10-CM

## 2022-02-10 PROCEDURE — 71046 X-RAY EXAM CHEST 2 VIEWS: CPT | Performed by: RADIOLOGY

## 2022-02-10 PROCEDURE — 99204 OFFICE O/P NEW MOD 45 MIN: CPT | Performed by: NURSE PRACTITIONER

## 2022-02-10 RX ORDER — ALBUTEROL SULFATE 90 UG/1
2 AEROSOL, METERED RESPIRATORY (INHALATION) EVERY 6 HOURS
Qty: 18 G | Refills: 1 | Status: SHIPPED | OUTPATIENT
Start: 2022-02-10 | End: 2023-05-15

## 2022-02-10 ASSESSMENT — PAIN SCALES - GENERAL: PAINLEVEL: NO PAIN (0)

## 2022-02-10 ASSESSMENT — MIFFLIN-ST. JEOR: SCORE: 1209

## 2022-02-10 NOTE — PATIENT INSTRUCTIONS
Patient Education     Understanding Frozen Shoulder    Frozen shoulder is a condition where the shoulder becomes painful and hard to move. The condition is sometimes called adhesive capsulitis.   The shoulder is a joint that is made up of many parts. These parts allow you to raise, rotate, and swing your arm. The parts of a normal shoulder are:     Humeral head. The ball at the top of the upper arm bone (humerus).    Scapula. The shoulder blade.    Glenoid. The shallow socket on the scapula. (The humeral head rests on the glenoid.)    Capsule. A sheet of tough tissue that encloses the joint and joins the ball to the socket.  With frozen shoulder, the capsule thickens, and shrinks and pulls in (contracts). It's not clear why this happens. It may be from swelling and irritation, or from scar tissue forming. Over time, this may result in pain, stiffness, and loss of movement in the shoulder.   What causes frozen shoulder?  Experts don t know for sure why frozen shoulder occurs. Some things can make the condition more likely. These include:     Being a woman    Being 40 to 60 years old    Having certain health conditions, such as diabetes or thyroid disease    Taking certain medicines    Not using the shoulder for a prolonged period of time, such as after an injury or surgery  Symptoms of frozen shoulder  Frozen shoulder typically occurs in 3 stages. Each stage will vary, but often lasts a few months or longer:     Freezing stage. The shoulder is very painful. Pain often gets worse when moving your arm and at night during sleep. The shoulder gradually becomes stiffer.    Frozen stage. The shoulder is very stiff and hard to move. Pain may be less than in the first stage. It may be hard to do daily tasks, such as dressing or bathing.    Thawing stage. Pain and stiffness slowly get better. In time, normal or almost normal use of the shoulder usually returns.  Treatment for frozen shoulder  Most cases of frozen shoulder  get better, even with no treatment. Treatment is done to help speed healing and help regain as much joint movement as possible. The best course of treatment will depend on your needs. It may include one or more of the following:     Prescription or over-the-counter medicines. These help relieve pain and reduce swelling. NSAIDs (nonsteroidal anti-inflammatory drugs) are the most common medicines used. Medicines may be prescribed or bought over the counter. They may be given as pills. Or they may be put on the skin as a gel, cream, or patch.    Stretching exercises. These help restore movement to the shoulder. You may do them on your own or under the care of a physical therapist.    Cortisone shots. These are given into your shoulder joint. They can t cure frozen shoulder. But they can help give short-term relief from symptoms and allow you to do exercises without too much pain.    Cold packs and heat packs. These can help relieve symptoms.  Keep in mind that getting better is a slow process. It can take a year or longer. If your shoulder doesn t get better on its own in this time, you may need surgery. This is done to loosen the tight tissues in the shoulder joint.   When to call your healthcare provider  Call your healthcare provider right away if you have any of these:    Fever of 100.4 F (38 C) or higher, or as directed by your provider    Chills    Symptoms that don t get better, or get worse    New symptoms  [employer] last reviewed this educational content on 6/1/2019 2000-2021 [employer]. All rights reserved. This information is not intended as a substitute for professional medical care. Always follow your healthcare professional's instructions.           Patient Education     Exercises for Shoulder Flexibility: Internal Rotation    This stretch can help restore shoulder flexibility and relieve pain over time. When stretching, be sure to breathe deeply. Follow any special instructions from  your healthcare provider or physical therapist.  1. While seated, move the arm on the side you want to stretch toward the middle of your back. The palm of your hand should face out.  2. Cup your other hand under the hand that s behind your back. Gently push your cupped hand upward until you feel the stretch in the shoulder. Try to hold the stretch for 5 seconds.  3. Work up to doing 3 sets of this stretch, 3 times a day. Work up to holding the stretch for 30 to 60 seconds.  Note: Keep your back straight. It s OK if your hand can t reach the middle of your back. Instead, start the stretch with your hand as close as you can get it to the middle of your back.  Frozen shoulder  Frozen shoulder is another name for adhesive capsulitis. This causes restricted movement in the shoulder. If you have frozen shoulder, this stretch may cause discomfort, especially when you first get started. A few months may pass before you achieve the results you want. But once your shoulder heals, it rarely becomes frozen again. So stick to your stretching program. If you have any questions, be sure to ask your healthcare provider.   Br[employer]on last reviewed this educational content on 12/1/2017 2000-2021 [employer]. All rights reserved. This information is not intended as a substitute for professional medical care. Always follow your healthcare professional's instructions.           Patient Education     Treating Frozen Shoulder: Preparing for Your Exercises  Doing special exercises is the first way to treat frozen shoulder. You may see a physical therapist who can help you learn to do them. If these exercises don t help, you may need further medical treatment.   Shoulder stretches    Doing stretches is often the best way to treat frozen shoulder. Stretching each day can help lessen the pain and restore shoulder flexibility. But it often takes a significant amount of time before you notice results. Try to be patient.   To warm  up, do the  pendulum.  While standing, let the hand on your frozen side dangle freely as you hold the back of a chair or a table top with your other hand. Slowly make circles and side-to-side motions with the frozen arm.   Physical therapy  Your healthcare provider may refer you to physical therapy. This hands-on care helps you learn how to do stretching exercises at home. A physical therapist may also work on restoring your shoulder flexibility. To do this, he or she may gently stretch and move your frozen shoulder.   Tips for shoulder stretches    Anti-inflammatory medicines or steroid injections can help relieve pain. This may help you do your stretches. Your healthcare provider can tell you more.    Mild and moist heat can help loosen your shoulder. Try taking a warm shower or bath just before you stretch.    A cold or ice pack can limit pain and swelling. Try icing your shoulder for a few minutes after you do your stretches.    Britton last reviewed this educational content on 5/1/2018 2000-2021 [employer]. All rights reserved. This information is not intended as a substitute for professional medical care. Always follow your healthcare professional's instructions.           Patient Education     Stroke and Heart Disease  Every part of your body, including your heart and brain, needs oxygen to work. Oxygen is carried in the blood. Blood vessels called arteries carry oxygen-rich blood throughout the body. Both heart attack and stroke are due to problems in the arteries. The same factors that cause heart disease can make you more likely to have a stroke.     Heart attack.  A heart attack is caused by a blockage in an artery that carries blood to the heart muscle. If blood is blocked, that part of the heart muscle is damaged or dies.    Stroke. If an artery that supplies blood to the brain is blocked, a stroke may happen. This is called an ischemic stroke. It is caused by a piece of plaque  breaking loose from an artery, such as a carotid artery in the neck. Or it may be caused by a blood clot from the heart traveling up into the brain. Another kind of stroke is a hemorrhagic stroke. This is caused by the rupture of a weakened blood vessel.  Both heart attack and stroke are medical emergencies. They can lead to serious health problems. They can even cause death.   Healthy artery  A healthy artery is a tube with flexible walls and a smooth inner lining. Blood flows freely through it.     Unhealthy artery  Artery problems start when the inner lining gets damaged. This is often because of risk factors such as high cholesterol, smoking, and high blood pressure. These can make the artery walls stiff. Plaque, a fatty mix of cholesterol and other material, forms in the lining. This narrows the space inside the artery. Plaque can break off and limit blood flow further along the blood vessels. It can also cause a blood clot to form. A blood clot may fully block the side of the artery.      Reducing your risk  Making changes that make your arteries healthier will help lower your risk for both heart attack and stroke. If you have heart disease, you may need to work on a few aspects of your lifestyle. But remember that the things that are good for your arteries, heart, and brain are also good for the rest of your body.   Your healthcare provider will work with you to make lifestyle changes as needed to help prevent your heart disease from getting worse. If it gets worse, that can lead to heart attack or stroke. Factors you may need to work on include:     Diet. Your healthcare provider will give you information on dietary changes that you may need to make. Your provider may advise that you see a registered dietitian for help. Changes may include:  ? Reducing fat and cholesterol intake  ? Reducing sodium (salt) intake, especially if you have high blood pressure  ? Eating more fresh vegetables and fruits  ? Eating  lean proteins, such as fish, poultry, and legumes (beans and peas) and eating less red meat and processed meats  ? Eating low- or no-fat dairy products  ? Using vegetable and nut oils in limited amounts  ? Limiting sweets and processed foods such as chips, cookies, and baked goods    Physical activity. Your healthcare provider may advise that you increase your physical activity if you have not been as active as possible. Your provider may advise you to include moderate to vigorous intensity activity for at least 40 minutes each day for at least 3 to 4 days per week. The amount will depend on your health. Examples of moderate to vigorous activity include:  ? Walking at a brisk pace, about 3 to 4 miles per hour  ? Jogging or running  ? Swimming or water aerobics  ? Hiking  ? Dancing  ? Martial arts  ? Tennis  ? Riding a bike or a stationary bike    Weight management.  If you are overweight or obese, your healthcare provider will work with you to lose weight and lower your BMI (body mass index) to a normal or near-normal level. Making dietary changes and increasing physical activity can help.    Smoking. If you smoke, break the smoking habit. Enroll in a stop-smoking program to improve your chances of success.    Stress. Learn stress management methods to help you deal with stress in your home and work life.  [employer] last reviewed this educational content on 4/1/2020 2000-2021 [employer]. All rights reserved. This information is not intended as a substitute for professional medical care. Always follow your healthcare professional's instructions.           Patient Education     Symptoms of a Stroke     A sudden feeling of weakness on one side of your body may be a sign that you are having a stroke.   During a stroke, blood stops flowing to part of the brain. This can damage areas in the brain that control the rest of the body. A stroke can happen to anyone at any age. Call 911 and get help right away  if any of these symptoms come on suddenly, even if the symptoms don t last.  Know the symptoms of a stroke    Weakness. You may feel a sudden weakness, tingling, or a loss of feeling on one side of your face or body including your arm or leg.     Vision problems. You may have sudden double vision or trouble seeing in one or both eyes.    Speech problems. You may have sudden trouble talking, slurred speech, or problems understanding others.    Headache. You may have a sudden, severe headache.    Movement problems. You may have sudden trouble walking, dizziness, a feeling of spinning, a loss of balance, a feeling of falling, or blackouts.    Seizure. You may also have a seizure as the first symptom of a stroke.     When to call 911  Remember: If you have any of these symptoms, or if someone you are with has these symptoms, call 911 as soon as possible.  Never drive yourself or the victim. The ambulance can alert the hospital and start treatment.   F.A.S.T. is an easy way to remember the signs of a stroke. When you see these signs, you will know that you need to call 911 fast.   F.A.S.T. stands for:    F is for face drooping. One side of the face is drooping or numb. When the person smiles, the smile is uneven.    A is for arm weakness. One arm is weak or numb. When the person lifts both arms at the same time, one arm may drift downward.    S is for speech difficulty. You may notice slurred speech or difficulty speaking. The person can't repeat a simple sentence correctly when asked.    T is for time to dial 911. If someone shows any of these symptoms, even if they go away, call 911 right away. Make note of the time the symptoms first appeared.  [employer] last reviewed this educational content on 3/1/2020    3654-2787 [employer]. All rights reserved. This information is not intended as a substitute for professional medical care. Always follow your healthcare professional's instructions.

## 2022-02-10 NOTE — TELEPHONE ENCOUNTER
"Patient was seen in the clinic today by Brandi Dumont NP. She ordered a Brain MRI which has been scheduled for tomorrow morning (2/11/22).     Patient called the clinic stating the MRI requires a PA.     Patient has also been doing a \"live chat\" with her insurance company.     I called Imaging scheduling and they said to call the Imaging Prior Authorization line at [phone]. I called this number and left a detailed message.     Patient would like a call back on her cell phone to inform her of the PA approval. She can be reached at [phone].    Ada Calero RN BSN  [facility]    "

## 2022-02-10 NOTE — RESULT ENCOUNTER NOTE
Omero Bey,    Thank you for your recent office visit.    Here are your recent results.  Per radiology- IMPRESSION: Mild compression and kyphosis mid thoracic spine.  Otherwise negative chest. The lungs are clear.    Do you think you want to try an inhaler for your cough?    Feel free to contact me via [employer] or call the clinic at [phone].    Sincerely,    AN Arizmendi, FNP-BC

## 2022-02-11 ENCOUNTER — HOSPITAL ENCOUNTER (OUTPATIENT)
Dept: MRI IMAGING | Facility: HOSPITAL | Age: 57
Discharge: HOME | End: 2022-02-11
Attending: NURSE PRACTITIONER | Admitting: NURSE PRACTITIONER
Payer: COMMERCIAL

## 2022-02-11 DIAGNOSIS — R29.810 FACIAL DROOP: ICD-10-CM

## 2022-02-11 PROCEDURE — 70553 MRI BRAIN STEM W/O & W/DYE: CPT

## 2022-02-11 PROCEDURE — 255N000002 HC RX 255 OP 636: Performed by: NURSE PRACTITIONER

## 2022-02-11 PROCEDURE — A9585 GADOBUTROL INJECTION: HCPCS | Performed by: NURSE PRACTITIONER

## 2022-02-11 RX ORDER — GADOBUTROL 604.72 MG/ML
6 INJECTION INTRAVENOUS ONCE
Status: COMPLETED | OUTPATIENT
Start: 2022-02-11 | End: 2022-02-11

## 2022-02-11 RX ADMIN — GADOBUTROL 6 ML: 604.72 INJECTION INTRAVENOUS at 12:11

## 2022-02-14 NOTE — RESULT ENCOUNTER NOTE
Omero Bey,    Thank you for your recent office visit.    Here are your recent results.  Per radiology- normal appearing MRI brain      IMPRESSION:  1.  No mass, mass effect or hemorrhage.     2.  No evidence for recent/evolving ischemic change.     3.  No pathologic intracranial enhancement.     4.  Minimal chronic ischemic change periventricular deep white matter both cerebral hemispheres.     5.  IAC regions appear symmetric with respect to signal characteristics and caliber of the porus acusticus. No pathologic enhancement in this region is evident.    Feel free to contact me via [employer] or call the clinic at [phone].    Sincerely,    Brandi Dumont, AN, FNP-BC

## 2022-02-15 ENCOUNTER — OFFICE VISIT (OUTPATIENT)
Dept: NEUROSURGERY | Facility: CLINIC | Age: 57
End: 2022-02-15
Payer: COMMERCIAL

## 2022-02-15 VITALS
WEIGHT: 138 LBS | SYSTOLIC BLOOD PRESSURE: 118 MMHG | OXYGEN SATURATION: 98 % | BODY MASS INDEX: 22.99 KG/M2 | RESPIRATION RATE: 16 BRPM | DIASTOLIC BLOOD PRESSURE: 77 MMHG | HEART RATE: 76 BPM | HEIGHT: 65 IN

## 2022-02-15 DIAGNOSIS — M40.00 KYPHOSIS (ACQUIRED) (POSTURAL): ICD-10-CM

## 2022-02-15 PROCEDURE — 99243 OFF/OP CNSLTJ NEW/EST LOW 30: CPT | Performed by: NURSE PRACTITIONER

## 2022-02-15 ASSESSMENT — MIFFLIN-ST. JEOR: SCORE: 1206.84
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
## 2022-02-15 NOTE — PATIENT INSTRUCTIONS
-We will obtain your imaging from [facility].   -I will contact you if there is a concern on the imaging comparison.   -Please contact our clinic with questions or concerns at [phone].

## 2022-02-15 NOTE — NURSING NOTE
"Maida Menendez is a 56 year old female who presents for:  Chief Complaint   Patient presents with     Consult     Kyphosis         Initial Vitals:  /77   Pulse 76   Resp 16   Ht 5' 5\" (1.651 m)   Wt 138 lb (62.6 kg)   SpO2 98%   BMI 22.96 kg/m   Estimated body mass index is 22.96 kg/m  as calculated from the following:    Height as of this encounter: 5' 5\" (1.651 m).    Weight as of this encounter: 138 lb (62.6 kg).. Body surface area is 1.69 meters squared. BP completed using cuff size: regular  Data Unavailable    Nursing Comments:     Chandni Bonner, VANESSA    "
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
## 2022-02-17 ENCOUNTER — LAB (OUTPATIENT)
Dept: LAB | Facility: CLINIC | Age: 57
End: 2022-02-17
Payer: COMMERCIAL

## 2022-02-17 DIAGNOSIS — Z78.0 MENOPAUSE: ICD-10-CM

## 2022-02-17 DIAGNOSIS — Z13.220 LIPID SCREENING: ICD-10-CM

## 2022-02-17 DIAGNOSIS — Z86.39 HISTORY OF VITAMIN D DEFICIENCY: ICD-10-CM

## 2022-02-17 DIAGNOSIS — Z13.29 SCREENING FOR THYROID DISORDER: ICD-10-CM

## 2022-02-17 DIAGNOSIS — R05.3 CHRONIC COUGH: ICD-10-CM

## 2022-02-17 LAB
DEPRECATED CALCIDIOL+CALCIFEROL SERPL-MC: 29 UG/L (ref 20–75)
ESTRADIOL SERPL-MCNC: <11 PG/ML
VIT B12 SERPL-MCNC: 456 PG/ML (ref 193–986)

## 2022-02-17 PROCEDURE — 82670 ASSAY OF TOTAL ESTRADIOL: CPT

## 2022-02-17 PROCEDURE — 36415 COLL VENOUS BLD VENIPUNCTURE: CPT

## 2022-02-17 PROCEDURE — 86628 CANDIDA ANTIBODY: CPT | Mod: 90

## 2022-02-17 PROCEDURE — 84590 ASSAY OF VITAMIN A: CPT | Mod: 90

## 2022-02-17 PROCEDURE — 84443 ASSAY THYROID STIM HORMONE: CPT

## 2022-02-17 PROCEDURE — 80061 LIPID PANEL: CPT

## 2022-02-17 PROCEDURE — 82180 ASSAY OF ASCORBIC ACID: CPT | Mod: 90

## 2022-02-17 PROCEDURE — 82607 VITAMIN B-12: CPT

## 2022-02-17 PROCEDURE — 82306 VITAMIN D 25 HYDROXY: CPT

## 2022-02-17 PROCEDURE — 99000 SPECIMEN HANDLING OFFICE-LAB: CPT

## 2022-02-17 PROCEDURE — 80069 RENAL FUNCTION PANEL: CPT

## 2022-02-17 PROCEDURE — 84597 ASSAY OF VITAMIN K: CPT | Mod: 90

## 2022-02-17 PROCEDURE — 84446 ASSAY OF VITAMIN E: CPT | Mod: 90

## 2022-02-18 ENCOUNTER — THERAPY VISIT (OUTPATIENT)
Dept: PHYSICAL THERAPY | Facility: CLINIC | Age: 57
End: 2022-02-18
Payer: COMMERCIAL

## 2022-02-18 DIAGNOSIS — M75.02 ADHESIVE CAPSULITIS OF LEFT SHOULDER: ICD-10-CM

## 2022-02-18 LAB
ALBUMIN SERPL-MCNC: 3.8 G/DL (ref 3.4–5)
ANION GAP SERPL CALCULATED.3IONS-SCNC: 6 MMOL/L (ref 3–14)
BUN SERPL-MCNC: 19 MG/DL (ref 7–30)
CALCIUM SERPL-MCNC: 9 MG/DL (ref 8.5–10.1)
CHLORIDE BLD-SCNC: 107 MMOL/L (ref 94–109)
CHOLEST SERPL-MCNC: 335 MG/DL
CO2 SERPL-SCNC: 26 MMOL/L (ref 20–32)
CREAT SERPL-MCNC: 0.62 MG/DL (ref 0.52–1.04)
FASTING STATUS PATIENT QL REPORTED: YES
GFR SERPL CREATININE-BSD FRML MDRD: >90 ML/MIN/1.73M2
GLUCOSE BLD-MCNC: 106 MG/DL (ref 70–99)
HDLC SERPL-MCNC: 39 MG/DL
LDLC SERPL CALC-MCNC: 255 MG/DL
NONHDLC SERPL-MCNC: 296 MG/DL
PHOSPHATE SERPL-MCNC: 4.7 MG/DL (ref 2.5–4.5)
POTASSIUM BLD-SCNC: 3.8 MMOL/L (ref 3.4–5.3)
SODIUM SERPL-SCNC: 139 MMOL/L (ref 133–144)
TRIGL SERPL-MCNC: 207 MG/DL
TSH SERPL DL<=0.005 MIU/L-ACNC: 1.34 MU/L (ref 0.4–4)

## 2022-02-18 PROCEDURE — 97161 PT EVAL LOW COMPLEX 20 MIN: CPT | Mod: GP

## 2022-02-18 PROCEDURE — 97110 THERAPEUTIC EXERCISES: CPT | Mod: GP

## 2022-02-18 NOTE — PROGRESS NOTES
Physical Therapy Initial Examination/Evaluation  February 18, 2022    Therapist Impression: Maida is a 56 year old year old female referred to physical therapy by Brandi Dumont NP for treatment of adhesive capsulitis of left shoulder. Patient presents to physical therapy with c/o left shoulder pain. Signs and symptoms are consistent with adhesive capsulitis. Due to these impairments, patient is unable to reach over head, make quick movements with left arm, and sleep on involved side throughout night. Patient will benefit from skilled PT in order to address impairments/limitations in order to return patient to goals and prior level of function.     Subjective:  Presenting Complaint Left shoulder pain   Mechanism of Injury  unknown   DOI (onset)/ DOS 5 months ago   Functional Limitations Reaching, lifting, sleeping, quick movements    Notable PMH See Epic chart; history of left shoulder adhesive capsulitis, left wrist injury (scapholunate)    Prior treatment Chiropractic for right shoulder that has since resolved; now has   good   Prior Imaging  None       Pain/Presentation    Pain Level   Rest: 3/10  ; Activity: 6/10   Location   left shoulder, up into left neck   Frequency Intermittent   Described as aching, dull, sharp, shooting and throbing    Alleviated by  Rest   Progression of Sx Unchanged   Time of Day  Night, Activity related and Position related   Sleeping  Interrupted due to current issue       Social Factors/Lifestyle  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   Patient Health History  Maida HAMPTON  Shon being seen for frozen shoulder (left).     Problem began: 9/18/2021.   Problem occurred: not sure   Pain is reported as 6/10 on pain scale.  General health as reported by patient is good.  Pertinent medical history includes: menopausal.   Red flags:  Cold/hot extremity.   Other medical allergies details: sulfa, dicycline.        Other medications details: collagen .    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   Primary job tasks include:  Computer work, driving, prolonged sitting and prolonged standing.                    SHOULDER EXAMINATION  Handedness: Right    CERVICAL SCREEN  AROM: right upper trap pain with flexion and left rotation   -1st rib hypomobility and tenderness; does sometimes have symptoms down arm that are most likely coming from hypertonicity of scalenes and cervical musculature     THORACIC SPINE SCREEN  Kyphotic thoracic spine and Restricted rotation to the left   Spring Testing: Hypomobile throughout entire upper thoracic spine   -Shoulder felt significantly better following supine thoracic extension over foam roller; improved forward flexion and less pain in shoulder    STATIC POSTURE  Forward head: trivial     Rounded shoulders:mild  Shoulder internally rotated: mild   Visual inspection: Elevated scapula on right         SHOULDER RANGE OF MOTION  AROM Flexion Abduction ER Base Ext/IR or hand behind back level   Left 134 114 34 T12   Right 136 full 66 Sacrum    Pain: present with ER and abduction; capular pattern present that is similar to that of adhesive capsulitis     JOINT MOBILITY  Hypomobile posterior and inferior capsule on left in supine       SHOULDER STRENGTH  MMT Flexion Scaption ER IR   Left 3/5 3/5 3/5 3/5   Right 4/5 4/5 4/5 4/5         PALPATION  Left: Moderate tenderness to palpation at upper trap, 1st rib, and posterior capsule  Right: No tenderness to palpation    System    Physical Exam    General     ROS    Assessment/Plan:    Patient is a 56 year old female with left  side shoulder complaints.    Patient has the following significant findings with corresponding treatment plan.                Diagnosis 1:  Adhesive capsulitis of left shoulder  Pain -  hot/cold therapy, electric stimulation, mechanical traction and manual therapy  Decreased ROM/flexibility - manual therapy and therapeutic exercise  Decreased joint mobility - manual therapy and therapeutic exercise  Decreased strength - therapeutic exercise and therapeutic activities  Impaired muscle performance - neuro re-education  Decreased function - therapeutic activities  Impaired posture - neuro re-education    Therapy Evaluation Codes:     Cumulative Therapy Evaluation is: Low complexity.    Previous and current functional limitations:  (See Goal Flow Sheet for this information)    Short term and Long term goals: (See Goal Flow Sheet for this information)     Communication ability:  Patient appears to be able to clearly communicate and understand verbal and written communication and follow directions correctly.  Treatment Explanation - The following has been discussed with the patient:   RX ordered/plan of care  Anticipated outcomes  Possible risks and side effects  This patient would benefit from PT intervention to resume normal activities.   Rehab potential is good.    Frequency:  1 X week, once daily  Duration:  for 12 weeks  Discharge Plan:  Achieve all LTG.  Independent in home treatment program.  Reach maximal therapeutic benefit.    Please refer to the daily flowsheet for treatment today, total treatment time and time spent performing 1:1 timed codes.

## 2022-02-20 LAB
A-TOCOPHEROL VIT E SERPL-MCNC: 24.8 MG/L
ANNOTATION COMMENT IMP: NORMAL
BETA+GAMMA TOCOPHEROL SERPL-MCNC: 2.6 MG/L
RETINYL PALMITATE SERPL-MCNC: 0.03 MG/L
VIT A SERPL-MCNC: 0.89 MG/L

## 2022-02-21 LAB — VIT C SERPL-MCNC: 88 UMOL/L

## 2022-02-22 LAB
C ALBICANS IGA SER-ACNC: 0.27 EV
C ALBICANS IGG QN: 0.65 EV
C ALBICANS IGM SER-ACNC: 0.64 EV
PHYTONADIONE SERPL-MCNC: NORMAL NMOL/L

## 2022-02-22 NOTE — RESULT ENCOUNTER NOTE
Omero Bey,    Thank you for your recent office visit.    Here are your recent results.  Your labs show that your cholesterol is pretty high.  Do you want to start medication for this or work on diet changes?    Feel free to contact me via [employer] or call the clinic at [phone].    Sincerely,    AN Arizmendi, FNP-BC

## 2022-02-23 ENCOUNTER — THERAPY VISIT (OUTPATIENT)
Dept: PHYSICAL THERAPY | Facility: CLINIC | Age: 57
End: 2022-02-23
Payer: COMMERCIAL

## 2022-02-23 DIAGNOSIS — M75.02 ADHESIVE CAPSULITIS OF LEFT SHOULDER: Primary | ICD-10-CM

## 2022-02-23 PROCEDURE — 97110 THERAPEUTIC EXERCISES: CPT | Mod: GP | Performed by: PHYSICAL THERAPIST

## 2022-02-23 PROCEDURE — 97530 THERAPEUTIC ACTIVITIES: CPT | Mod: GP | Performed by: PHYSICAL THERAPIST

## 2022-03-03 ENCOUNTER — TELEPHONE (OUTPATIENT)
Dept: RHEUMATOLOGY | Facility: CLINIC | Age: 57
End: 2022-03-03

## 2022-03-03 NOTE — TELEPHONE ENCOUNTER
FW: need followup appointment  Received: Yesterday  Duncan Burrell, RN  P Clinic Coordinators-[facility]-; Sara Victor  Please see below.     Thanks,   Duncan             Previous Messages       ----- Message -----   From: Ramin Ngo MD   Sent: 3/2/2022   9:18 AM CST   To: Duncan Burrell RN, *   Subject: need followup appointment                         Maida needs a followup appointment with me. Ok to be virtual. Ok to use first JOSLYN slot. I don't see that she has been contacted about this at all do date.   Dr. Goyal

## 2022-03-23 ENCOUNTER — THERAPY VISIT (OUTPATIENT)
Dept: PHYSICAL THERAPY | Facility: CLINIC | Age: 57
End: 2022-03-23
Payer: COMMERCIAL

## 2022-03-23 DIAGNOSIS — M75.02 ADHESIVE CAPSULITIS OF LEFT SHOULDER: ICD-10-CM

## 2022-03-23 PROCEDURE — 97530 THERAPEUTIC ACTIVITIES: CPT | Mod: GP | Performed by: PHYSICAL THERAPIST

## 2022-03-23 PROCEDURE — 97110 THERAPEUTIC EXERCISES: CPT | Mod: GP | Performed by: PHYSICAL THERAPIST

## 2022-03-23 PROCEDURE — 97140 MANUAL THERAPY 1/> REGIONS: CPT | Mod: GP | Performed by: PHYSICAL THERAPIST

## 2022-03-31 ENCOUNTER — THERAPY VISIT (OUTPATIENT)
Dept: PHYSICAL THERAPY | Facility: CLINIC | Age: 57
End: 2022-03-31
Payer: COMMERCIAL

## 2022-03-31 DIAGNOSIS — M75.02 ADHESIVE CAPSULITIS OF LEFT SHOULDER: ICD-10-CM

## 2022-03-31 PROCEDURE — 97140 MANUAL THERAPY 1/> REGIONS: CPT | Mod: GP | Performed by: PHYSICAL THERAPIST

## 2022-03-31 PROCEDURE — 97110 THERAPEUTIC EXERCISES: CPT | Mod: GP | Performed by: PHYSICAL THERAPIST

## 2022-03-31 PROCEDURE — 97530 THERAPEUTIC ACTIVITIES: CPT | Mod: GP | Performed by: PHYSICAL THERAPIST

## 2022-04-13 ENCOUNTER — THERAPY VISIT (OUTPATIENT)
Dept: PHYSICAL THERAPY | Facility: CLINIC | Age: 57
End: 2022-04-13
Payer: COMMERCIAL

## 2022-04-13 DIAGNOSIS — M75.02 ADHESIVE CAPSULITIS OF LEFT SHOULDER: Primary | ICD-10-CM

## 2022-04-13 PROCEDURE — 97110 THERAPEUTIC EXERCISES: CPT | Mod: GP | Performed by: PHYSICAL THERAPIST

## 2022-04-13 PROCEDURE — 97530 THERAPEUTIC ACTIVITIES: CPT | Mod: GP | Performed by: PHYSICAL THERAPIST

## 2022-04-27 ENCOUNTER — TELEPHONE (OUTPATIENT)
Dept: FAMILY MEDICINE | Facility: CLINIC | Age: 57
End: 2022-04-27
Payer: COMMERCIAL

## 2022-04-27 NOTE — LETTER
May 4, 2022      [place]ida Menendez  [address] [address] [place] [address]        Dear Maida,     Your healthcare team cares about your health. To provide you with the best care,   we have reviewed your chart and based on our findings, we see that you are due to:     - BREAST CANCER SCREENING:  Schedule Annual Mammogram. Breast center scheduling number - [phone] or schedule in MyChart (self referall)    If you have already completed these items, please contact the clinic via phone or   [employer] so your care team can review and update your records. Thank you for   choosing [facility] for your healthcare needs. For any questions,   concerns, or to schedule an appointment please contact the clinic.       Healthy Regards,      Your [facility] Care Team

## 2022-04-27 NOTE — TELEPHONE ENCOUNTER
Patient Quality Outreach    Patient is due for the following:   Breast Cancer Screening - Mammogram    Type of outreach:    Sent [employer] message.      Questions for provider review:    None     Maureen Neves  Chart routed to Care Team.

## 2022-04-29 ENCOUNTER — THERAPY VISIT (OUTPATIENT)
Dept: PHYSICAL THERAPY | Facility: CLINIC | Age: 57
End: 2022-04-29
Payer: COMMERCIAL

## 2022-04-29 DIAGNOSIS — M75.02 ADHESIVE CAPSULITIS OF LEFT SHOULDER: Primary | ICD-10-CM

## 2022-04-29 PROCEDURE — 97140 MANUAL THERAPY 1/> REGIONS: CPT | Mod: GP | Performed by: PHYSICAL THERAPIST

## 2022-04-29 PROCEDURE — 97110 THERAPEUTIC EXERCISES: CPT | Mod: GP | Performed by: PHYSICAL THERAPIST

## 2022-05-09 ENCOUNTER — THERAPY VISIT (OUTPATIENT)
Dept: PHYSICAL THERAPY | Facility: CLINIC | Age: 57
End: 2022-05-09
Payer: COMMERCIAL

## 2022-05-09 DIAGNOSIS — M75.02 ADHESIVE CAPSULITIS OF LEFT SHOULDER: Primary | ICD-10-CM

## 2022-05-09 PROCEDURE — 97110 THERAPEUTIC EXERCISES: CPT | Mod: GP | Performed by: PHYSICAL THERAPIST

## 2022-05-09 PROCEDURE — 97530 THERAPEUTIC ACTIVITIES: CPT | Mod: GP | Performed by: PHYSICAL THERAPIST

## 2022-05-20 ENCOUNTER — THERAPY VISIT (OUTPATIENT)
Dept: PHYSICAL THERAPY | Facility: CLINIC | Age: 57
End: 2022-05-20
Payer: COMMERCIAL

## 2022-05-20 DIAGNOSIS — M75.02 ADHESIVE CAPSULITIS OF LEFT SHOULDER: Primary | ICD-10-CM

## 2022-05-20 PROCEDURE — 97110 THERAPEUTIC EXERCISES: CPT | Mod: GP | Performed by: PHYSICAL THERAPIST

## 2022-05-20 PROCEDURE — 97530 THERAPEUTIC ACTIVITIES: CPT | Mod: GP | Performed by: PHYSICAL THERAPIST

## 2022-05-20 PROCEDURE — 97140 MANUAL THERAPY 1/> REGIONS: CPT | Mod: GP | Performed by: PHYSICAL THERAPIST

## 2022-06-06 ENCOUNTER — THERAPY VISIT (OUTPATIENT)
Dept: PHYSICAL THERAPY | Facility: CLINIC | Age: 57
End: 2022-06-06
Payer: COMMERCIAL

## 2022-06-06 ENCOUNTER — VIRTUAL VISIT (OUTPATIENT)
Dept: RHEUMATOLOGY | Facility: CLINIC | Age: 57
End: 2022-06-06
Attending: STUDENT IN AN ORGANIZED HEALTH CARE EDUCATION/TRAINING PROGRAM
Payer: COMMERCIAL

## 2022-06-06 DIAGNOSIS — M19.90 INFLAMMATORY ARTHRITIS: ICD-10-CM

## 2022-06-06 DIAGNOSIS — R05.3 CHRONIC COUGH: ICD-10-CM

## 2022-06-06 DIAGNOSIS — M34.1 CREST SYNDROME (H): Primary | ICD-10-CM

## 2022-06-06 DIAGNOSIS — R06.09 DOE (DYSPNEA ON EXERTION): ICD-10-CM

## 2022-06-06 DIAGNOSIS — M75.02 ADHESIVE CAPSULITIS OF LEFT SHOULDER: Primary | ICD-10-CM

## 2022-06-06 DIAGNOSIS — M06.09 SERONEGATIVE RHEUMATOID ARTHRITIS OF MULTIPLE SITES (H): ICD-10-CM

## 2022-06-06 PROCEDURE — 99215 OFFICE O/P EST HI 40 MIN: CPT | Mod: 95 | Performed by: STUDENT IN AN ORGANIZED HEALTH CARE EDUCATION/TRAINING PROGRAM

## 2022-06-06 PROCEDURE — 97110 THERAPEUTIC EXERCISES: CPT | Mod: GP | Performed by: PHYSICAL THERAPIST

## 2022-06-06 PROCEDURE — 97530 THERAPEUTIC ACTIVITIES: CPT | Mod: GP | Performed by: PHYSICAL THERAPIST

## 2022-06-06 NOTE — PROGRESS NOTES
Maida is a 56 year old who is being evaluated via a billable video visit.      How would you like to obtain your AVS? MyChart  If the video visit is dropped, the invitation should be resent by: Send to e-mail at: [email or web address]  Will anyone else be joining your video visit? No      Video Start Time: 11:00    Video-Visit Details    Type of service:  Video Visit    Video End Time:11:25    Originating Location (pt. Location): Home    Distant Location (provider location):  [facility] [place]     Platform used for Video Visit: Pam Ngo MD, PhD

## 2022-06-06 NOTE — PROGRESS NOTES
[facility] Rheumatology  Today's visit: 06/06/22   Last seen: 12/16/20  ==================================================  CC: joint swelling, CREST    SUBJECTIVE: Compared to 2 years ago, Maida is doing great. She did therapy, used tumeric for some time, then started collagen and had some significant improvement. She developed a chronic cough that is sporadic, some days worst than others, for the last several years and started to wonder if it was reflux related so she made an effort to reduce anything with acidity and thus discontinued the tumeric. She also started a prebiotic and a postbiotic. This has not significantly improved her cough so far. For the last few months she was experiencing left sided facial droop that would come and go. Her new primary provider sent her for an MRI which looked good without evidence of stroke. She then went to the dentist and found out she needed a root canal on the left side which she thinks may have been responsible all along. She had a frozen right shoulder some time ago that has fully recovered and more recently a frozen left shoulder for which she is getting PT. Her left thumb is particularly bothersome to her especially when using it but does not swell. Her left wrist is largely recovered to >90% of original function. She can now to yoga with adjustments and has returned to golfing. She has noted increasing shortness of breath with exertion, specifically whenever she is walking uphill.    ROS: 10 point ROS completed and negative except as noted per HPI  ALLERGIES: doxycycline, sulfa drugs  MEDS: collagen, prebiotic, postbiotic  PM/SHx: allergies, HLD, varicose vein stripping, sinus surgery, hysterectomy  FAMHx: father had PMR that cause bilateral hand pain, both parents had cardiovascular disease    PHYSICAL EXAM  No vitals today  General: NAD, pleasant  HEENT: face symmetric, no telangiectasias, even facial expression, no obvious restriction of oral aperture  Resp: able to  complete long sentences without dyspnea or pause  Skin: no rashes of face, upper arms, neck, chest  MSK: no swelling of left PIP or wrist with full ROM of both demonstrated  Psych: congruent mood and affect    MR left wrist without contrast 5/7/2020 2:23 PM                                                            1. Suspect complete tear of dorsal component of scapholunate interosseous ligament and degenerative tear of membranous and volar components.   2. Extensive marrow edema, joint effusion with internal debris, likely synovitis. Constellation of findings are most concerning for underlying inflammatory arthritis. Alternatively reflecting multiple bone contusions though extent seems atypical for contusion.  3. Suspected tearing of the distal lamina of the triangular ligament.    XRs of wrists, hands, feet from 10/29/2020 personally reviewed and reviewed with patient    RHEUM RESULTS Latest Ref Rng & Units 5/28/2020 10/13/2020 2/17/2022   ALBUMIN 3.4 - 5.0 g/dL - - 3.8   DANIELLE INTERPRETATION NEG:Negative Positive(A) - -   ANAP1 - CENTROMERE - -   ANAT1 - 1:1,280 - -   CREATININE 0.52 - 1.04 mg/dL - - 0.62   CRP 0.0 - 8.0 mg/L 7.0 4.5 -   GFR ESTIMATE >60 mL/min/1.73m2 - - >90   HEMATOCRIT 35.0 - 47.0 % 40.8 - -   HEMOGLOBIN 11.7 - 15.7 g/dL 12.6 - -   WBC 4.0 - 11.0 10e9/L 8.9 - -   RBC 3.8 - 5.2 10e12/L 4.20 - -   RDW 10.0 - 15.0 % 13.5 - -   MCHC 31.5 - 36.5 g/dL 30.9(L) - -   MCV 78 - 100 fl 97 - -   PLATELET COUNT 150 - 450 10e9/L 255 - -   RHEUMATOID FACTOR <12 IU/mL <7 - -   ESR 0 - 30 mm/h 45(H) - -     Lab Results   Component Value Date/Time    JEFF Positive (A) 05/28/2020 12:44 PM    ANAP1 CENTROMERE 05/28/2020 12:44 PM    ANAT1 1:1,280 05/28/2020 12:44 PM    RHF <7 05/28/2020 12:44 PM    CCPIGG 1 05/28/2020 12:44 PM     10/19 Left wrist aspiration with negative cultures, PMNs on gram stain, no crystals  10/19 Left wrist cultures with no growth, no anaerobes    Infectious screens:  Lyme negative  No  quantiferon, hepatitis, hiv screens to date    ASSESSMENT:   55 yo F with DANIELLE+ centromere+ LcSCC (CREST) with ongoing left wrist pain/swelling >6 months who subsequently developed swelling of left 2nd finger PIP of >1 month. Overall picture is consistent with a progressive seronegative inflammatory arthritis which may be a part of or separate from her CREST diagnosis.     ## seronegative inflammatory arthritis  Given presence of small erosions on imaging I had recommended methotrexate therapy however Maida is not a big fan of taking medications. We did a repeat XR screen of her joints which did not show progressive erosive disease. Since that time she has had significant improvement in function without immunomodulatory therapy. She can continue to use naproxen as needed.    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 Prior screening evaluations were unable to be completed during global pandemic. Given cough and SAEED, will screen with HRCT, PFT, 6MW, and cardiac echo for interstitial lung disease, pulmonary hypertension.    RTC 1 year, sooner pending above study results    Ramin Ngo MD, PhD  Rheumatology    50 minutes spent on the date of encounter doing chart review, history and exam, documentation, care coordination, and further activities as noted above

## 2022-06-06 NOTE — PATIENT INSTRUCTIONS
Nice to see you again Mercedez!  I have ordered the pulmonary function testing, cardiac ultrasound, and high resolution chest CT. We can discuss results as they come. Otherwise, let's plan to see each other in a year.

## 2022-06-06 NOTE — LETTER
6/6/2022       RE: Maida Menendez  [address] [facility]  [place] [address]     Dear Colleague,    Thank you for referring your patient, Maida Menendez, to the [facility] RHEUMATOLOGY CLINIC [place] at [facility]. Please see a copy of my visit note below.    [facility] Rheumatology  Today's visit: 06/06/22   Last seen: 12/16/20  ==================================================  CC: joint swelling, CREST    SUBJECTIVE: Compared to 2 years ago, Maida is doing great. She did therapy, used tumeric for some time, then started collagen and had some significant improvement. She developed a chronic cough that is sporadic, some days worst than others, for the last several years and started to wonder if it was reflux related so she made an effort to reduce anything with acidity and thus discontinued the tumeric. She also started a prebiotic and a postbiotic. This has not significantly improved her cough so far. For the last few months she was experiencing left sided facial droop that would come and go. Her new primary provider sent her for an MRI which looked good without evidence of stroke. She then went to the dentist and found out she needed a root canal on the left side which she thinks may have been responsible all along. She had a frozen right shoulder some time ago that has fully recovered and more recently a frozen left shoulder for which she is getting PT. Her left thumb is particularly bothersome to her especially when using it but does not swell. Her left wrist is largely recovered to >90% of original function. She can now to yoga with adjustments and has returned to golfing. She has noted increasing shortness of breath with exertion, specifically whenever she is walking uphill.    ROS: 10 point ROS completed and negative except as noted per HPI  ALLERGIES: doxycycline, sulfa drugs  MEDS: collagen, prebiotic, postbiotic  PM/SHx: allergies, HLD, varicose vein  stripping, sinus surgery, hysterectomy  FAMHx: father had PMR that cause bilateral hand pain, both parents had cardiovascular disease    PHYSICAL EXAM  No vitals today  General: NAD, pleasant  HEENT: face symmetric, no telangiectasias, even facial expression, no obvious restriction of oral aperture  Resp: able to complete long sentences without dyspnea or pause  Skin: no rashes of face, upper arms, neck, chest  MSK: no swelling of left PIP or wrist with full ROM of both demonstrated  Psych: congruent mood and affect    MR left wrist without contrast 5/7/2020 2:23 PM                                                            1. Suspect complete tear of dorsal component of scapholunate interosseous ligament and degenerative tear of membranous and volar components.   2. Extensive marrow edema, joint effusion with internal debris, likely synovitis. Constellation of findings are most concerning for underlying inflammatory arthritis. Alternatively reflecting multiple bone contusions though extent seems atypical for contusion.  3. Suspected tearing of the distal lamina of the triangular ligament.    XRs of wrists, hands, feet from 10/29/2020 personally reviewed and reviewed with patient    RHEUM RESULTS Latest Ref Rng & Units 5/28/2020 10/13/2020 2/17/2022   ALBUMIN 3.4 - 5.0 g/dL - - 3.8   DANIELLE INTERPRETATION NEG:Negative Positive(A) - -   ANAP1 - CENTROMERE - -   ANAT1 - 1:1,280 - -   CREATININE 0.52 - 1.04 mg/dL - - 0.62   CRP 0.0 - 8.0 mg/L 7.0 4.5 -   GFR ESTIMATE >60 mL/min/1.73m2 - - >90   HEMATOCRIT 35.0 - 47.0 % 40.8 - -   HEMOGLOBIN 11.7 - 15.7 g/dL 12.6 - -   WBC 4.0 - 11.0 10e9/L 8.9 - -   RBC 3.8 - 5.2 10e12/L 4.20 - -   RDW 10.0 - 15.0 % 13.5 - -   MCHC 31.5 - 36.5 g/dL 30.9(L) - -   MCV 78 - 100 fl 97 - -   PLATELET COUNT 150 - 450 10e9/L 255 - -   RHEUMATOID FACTOR <12 IU/mL <7 - -   ESR 0 - 30 mm/h 45(H) - -     Lab Results   Component Value Date/Time    JEFF Positive (A) 05/28/2020 12:44 PM    ANAP1  CENTROMERE 05/28/2020 12:44 PM    ANAT1 1:1,280 05/28/2020 12:44 PM    RHF <7 05/28/2020 12:44 PM    CCPIGG 1 05/28/2020 12:44 PM     10/19 Left wrist aspiration with negative cultures, PMNs on gram stain, no crystals  10/19 Left wrist cultures with no growth, no anaerobes    Infectious screens:  Lyme negative  No quantiferon, hepatitis, hiv screens to date    ASSESSMENT:   57 yo F with DANIELLE+ centromere+ LcSCC (CREST) with ongoing left wrist pain/swelling >6 months who subsequently developed swelling of left 2nd finger PIP of >1 month. Overall picture is consistent with a progressive seronegative inflammatory arthritis which may be a part of or separate from her CREST diagnosis.     ## seronegative inflammatory arthritis  Given presence of small erosions on imaging I had recommended methotrexate therapy however Maida is not a big fan of taking medications. We did a repeat XR screen of her joints which did not show progressive erosive disease. Since that time she has had significant improvement in function without immunomodulatory therapy. She can continue to use naproxen as needed.    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 Prior screening evaluations were unable to be completed during global pandemic. Given cough and SAEED, will screen with HRCT, PFT, 6MW, and cardiac echo for interstitial lung disease, pulmonary hypertension.    RTC 1 year, sooner pending above study results    Ramin Ngo MD, PhD  Rheumatology    50 minutes spent on the date of encounter doing chart review, history and exam, documentation, care coordination, and further activities as noted above    Maida is a 56 year old who is being evaluated via a billable video visit.      How would you like to obtain your AVS? MyChart  If the video visit is dropped, the invitation should be resent by: Send to e-mail at:  [email or web address]  Will anyone else be joining your video visit? No      Video Start Time: 11:00    Video-Visit Details    Type of service:  Video Visit    Video End Time:11:25    Originating Location (pt. Location): Home    Distant Location (provider location):  [facility] RHEUMATOLOGY [facility] [place]     Platform used for Video Visit: [email or web address]     Ramin Ngo MD, PhD      Again, thank you for allowing me to participate in the care of your patient.      Sincerely,    Ramin Ngo MD

## 2022-06-06 NOTE — LETTER
Date:June 8, 2022      Provider requested that no letter be sent. Do not send.       [facility]

## 2022-06-13 ENCOUNTER — ANCILLARY PROCEDURE (OUTPATIENT)
Dept: CT IMAGING | Facility: CLINIC | Age: 57
End: 2022-06-13
Attending: STUDENT IN AN ORGANIZED HEALTH CARE EDUCATION/TRAINING PROGRAM
Payer: COMMERCIAL

## 2022-06-13 DIAGNOSIS — M06.09 SERONEGATIVE RHEUMATOID ARTHRITIS OF MULTIPLE SITES (H): ICD-10-CM

## 2022-06-13 DIAGNOSIS — M34.1 CREST SYNDROME (H): ICD-10-CM

## 2022-06-13 DIAGNOSIS — R05.3 CHRONIC COUGH: ICD-10-CM

## 2022-06-13 DIAGNOSIS — R06.09 DOE (DYSPNEA ON EXERTION): ICD-10-CM

## 2022-06-13 PROCEDURE — 71250 CT THORAX DX C-: CPT | Mod: TC | Performed by: RADIOLOGY

## 2022-06-17 DIAGNOSIS — R05.3 CHRONIC COUGH: ICD-10-CM

## 2022-06-17 DIAGNOSIS — M06.09 SERONEGATIVE RHEUMATOID ARTHRITIS OF MULTIPLE SITES (H): ICD-10-CM

## 2022-06-17 DIAGNOSIS — M34.1 CREST (CALCINOSIS, RAYNAUD'S PHENOMENON, ESOPHAGEAL DYSFUNCTION, SCLERODACTYLY, TELANGIECTASIA) (H): Primary | ICD-10-CM

## 2022-06-17 DIAGNOSIS — M34.1 CREST SYNDROME (H): Primary | ICD-10-CM

## 2022-06-17 DIAGNOSIS — R06.09 DOE (DYSPNEA ON EXERTION): ICD-10-CM

## 2022-06-17 LAB
6 MIN WALK (FT): 1500 FT
6 MIN WALK (M): 457 M

## 2022-06-17 PROCEDURE — 94618 PULMONARY STRESS TESTING: CPT | Performed by: INTERNAL MEDICINE

## 2022-06-17 PROCEDURE — 94726 PLETHYSMOGRAPHY LUNG VOLUMES: CPT | Performed by: INTERNAL MEDICINE

## 2022-06-17 PROCEDURE — 94729 DIFFUSING CAPACITY: CPT | Performed by: INTERNAL MEDICINE

## 2022-06-17 PROCEDURE — 94060 EVALUATION OF WHEEZING: CPT | Performed by: INTERNAL MEDICINE

## 2022-06-20 ENCOUNTER — TELEPHONE (OUTPATIENT)
Dept: RHEUMATOLOGY | Facility: CLINIC | Age: 57
End: 2022-06-20

## 2022-06-20 LAB
DLCOUNC-%PRED-PRE: 80 %
DLCOUNC-PRE: 16.94 ML/MIN/MMHG
DLCOUNC-PRED: 21.08 ML/MIN/MMHG
ERV-%PRED-PRE: 53 %
ERV-PRE: 0.5 L
ERV-PRED: 0.95 L
EXPTIME-PRE: 6.8 SEC
FEF2575-%PRED-POST: 70 %
FEF2575-%PRED-PRE: 59 %
FEF2575-POST: 1.71 L/SEC
FEF2575-PRE: 1.42 L/SEC
FEF2575-PRED: 2.41 L/SEC
FEFMAX-%PRED-PRE: 60 %
FEFMAX-PRE: 3.98 L/SEC
FEFMAX-PRED: 6.55 L/SEC
FEV1-%PRED-PRE: 75 %
FEV1-PRE: 1.98 L
FEV1FEV6-PRE: 72 %
FEV1FEV6-PRED: 81 %
FEV1FVC-PRE: 72 %
FEV1FVC-PRED: 79 %
FEV1SVC-PRE: 69 %
FEV1SVC-PRED: 77 %
FIFMAX-PRE: 3.96 L/SEC
FRCPLETH-%PRED-PRE: 120 %
FRCPLETH-PRE: 3.31 L
FRCPLETH-PRED: 2.76 L
FVC-%PRED-PRE: 81 %
FVC-PRE: 2.74 L
FVC-PRED: 3.34 L
IC-%PRED-PRE: 94 %
IC-PRE: 2.36 L
IC-PRED: 2.49 L
RVPLETH-%PRED-PRE: 146 %
RVPLETH-PRE: 2.81 L
RVPLETH-PRED: 1.92 L
TLCPLETH-%PRED-PRE: 111 %
TLCPLETH-PRE: 5.67 L
TLCPLETH-PRED: 5.11 L
VA-%PRED-PRE: 89 %
VA-PRE: 4.48 L
VC-%PRED-PRE: 83 %
VC-PRE: 2.86 L
VC-PRED: 3.44 L

## 2022-06-20 NOTE — TELEPHONE ENCOUNTER
Ramin Ngo MD Beard, Madeline, RN  Caller: Unspecified (Today,  9:29 AM)  Curious why she doesn't send these messages to you first?     Maida is not on any immune suppression   She needs to see if she qualifies for paxlovid for other reasons. She can see her PCP or make a telehealth visit for review.   I am waiting for the final results on her PFTs. I can decide what to do in terms of repeating them once we have those results   We should probably alert the PFT lab?     Ramin     Called and spoke with pt, relayed that it will take several days for us to get the results of PFTs.  She understands.    She states that she normally has a lot of sinus drainage and does have a cough daily. She has been feeling a little more run down and fatigued than normal. But otherwise no other symptoms.  She took a test today as a guest from over the weekend reported being positive.  She will make an appt to see PCP or urgent care for Covid treatment options. She is not sure when she actually started feeling bad, may have been last week sometime.    Asked pt if she was ok if I updated Respiratory that she is covid positive and she stated she would be grateful if I did.  Updated RT lab.    Day Arredondo RN  Rheumatology Clinic

## 2022-06-20 NOTE — TELEPHONE ENCOUNTER
Health Call Center    Phone Message    May a detailed message be left on voicemail: yes     Reason for Call: Symptoms or Concerns     If patient has red-flag symptoms, warm transfer to triage line    Current symptom or concern: Tested positive for COVID-19 today; Pt had PFT on Friday, 6/17/22 due to a bad cough, and on Friday she had a very bad cough.    Pt is wondering if there is anything she could take or do to help with the COVID-19.    Pt states she would like to know what her results are on her PFT test? Does she need to redo the test again after she recovers?    Please contact the Pt back to let her know what she should do?    Symptoms have been present for:  4 day(s)      Are there any new or worsening symptoms? Yes: Worsening      Action Taken: Message routed to:  [facility] Adult Rheumatology

## 2022-06-21 ENCOUNTER — VIRTUAL VISIT (OUTPATIENT)
Dept: FAMILY MEDICINE | Facility: CLINIC | Age: 57
End: 2022-06-21
Payer: COMMERCIAL

## 2022-06-21 DIAGNOSIS — R09.81 CONGESTION OF PARANASAL SINUS: Primary | ICD-10-CM

## 2022-06-21 DIAGNOSIS — U07.1 INFECTION DUE TO 2019 NOVEL CORONAVIRUS: ICD-10-CM

## 2022-06-21 PROCEDURE — 99214 OFFICE O/P EST MOD 30 MIN: CPT | Mod: 95 | Performed by: FAMILY MEDICINE

## 2022-06-21 NOTE — PROGRESS NOTES
Maida is a 56 year old who is being evaluated via a billable video visit.      How would you like to obtain your AVS? MyChart  If the video visit is dropped, the invitation should be resent by: Send to e-mail at: [email or web address]  Will anyone else be joining your video visit? No          Assessment & Plan     Congestion of paranasal sinus  Symptomatic care for the sinus congestion.  Use saline nasal rinse.  Symptoms most likely due to diagnosis of COVID.  She does not have any breathing difficulty she is improving.    Infection due to 2019 novel coronavirus  Discussed about treatment options including paxlovid she is eligible for that however patient feels her symptoms are already improving she would like to hold on any antiviral medication at this time risk and benefits were discussed in duration and treatment window is also discussed.  Patient agrees with that and she would like to do a symptomatic care.  Quarantine rules were discussed with the patient.      }         Return in about 2 weeks (around 7/5/2022) for if symptom does not get better.    Aryan Lira MD  [facility]    Subjective   Maida is a 56 year old{ACCOMPANIED BY STATEMENT , presenting for the following health issues:  No chief complaint on file.      HPI       COVID-19 Symptom Review  How many days ago did these symptoms start? 5 days  Positive Covid 6/20  Are any of the following symptoms significant for you?    New or worsening difficulty breathing? No    Worsening cough? Yes, it's a dry cough.     Fever or chills? No but chills    Headache: no    Sore throat: no    Chest pain: no    Diarrhea: no    Body aches? no    What treatments has patient tried? Decongestant - oral   Does patient live in a nursing home, group home, or shelter? no  Does patient have a way to get food/medications during quarantined? Yes, I have a friend or family member who can help me. and Yes           Review of Systems   Constitutional,  HEENT, cardiovascular, pulmonary, gi and gu systems are negative, except as otherwise noted.      Objective           Vitals:  No vitals were obtained today due to virtual visit.    Physical Exam   GENERAL: Healthy, alert and no distress  EYES: Eyes grossly normal to inspection.  No discharge or erythema, or obvious scleral/conjunctival abnormalities.  RESP: No audible wheeze, cough, or visible cyanosis.  No visible retractions or increased work of breathing.    SKIN: Visible skin clear. No significant rash, abnormal pigmentation or lesions.  NEURO: Cranial nerves grossly intact.  Mentation and speech appropriate for age.  PSYCH: Mentation appears normal, affect normal/bright, judgement and insight intact, normal speech and appearance well-groomed.              Video-Visit Details    Video Start Time 1:55    Type of service:  Video Visit    Video End Time:2:07 PM    Originating Location (pt. Location): Home    Distant Location (provider location):  [facility]     Platform used for Video Visit: [employer]    .  Kimberli.

## 2022-07-07 ENCOUNTER — ANCILLARY PROCEDURE (OUTPATIENT)
Dept: CARDIOLOGY | Facility: CLINIC | Age: 57
End: 2022-07-07
Attending: STUDENT IN AN ORGANIZED HEALTH CARE EDUCATION/TRAINING PROGRAM
Payer: COMMERCIAL

## 2022-07-07 DIAGNOSIS — M34.1 CREST SYNDROME (H): ICD-10-CM

## 2022-07-07 DIAGNOSIS — R05.3 CHRONIC COUGH: ICD-10-CM

## 2022-07-07 DIAGNOSIS — M06.09 SERONEGATIVE RHEUMATOID ARTHRITIS OF MULTIPLE SITES (H): ICD-10-CM

## 2022-07-07 DIAGNOSIS — R06.09 DOE (DYSPNEA ON EXERTION): ICD-10-CM

## 2022-07-07 LAB — LVEF ECHO: NORMAL

## 2022-07-07 PROCEDURE — 93306 TTE W/DOPPLER COMPLETE: CPT | Performed by: INTERNAL MEDICINE

## 2022-07-12 ENCOUNTER — MYC MEDICAL ADVICE (OUTPATIENT)
Dept: FAMILY MEDICINE | Facility: CLINIC | Age: 57
End: 2022-07-12

## 2022-07-14 ENCOUNTER — VIRTUAL VISIT (OUTPATIENT)
Dept: FAMILY MEDICINE | Facility: CLINIC | Age: 57
End: 2022-07-14
Payer: COMMERCIAL

## 2022-07-14 DIAGNOSIS — R05.9 COUGH: ICD-10-CM

## 2022-07-14 DIAGNOSIS — U07.1 INFECTION DUE TO 2019 NOVEL CORONAVIRUS: ICD-10-CM

## 2022-07-14 DIAGNOSIS — R50.9 FEVER, UNSPECIFIED FEVER CAUSE: ICD-10-CM

## 2022-07-14 PROCEDURE — 99214 OFFICE O/P EST MOD 30 MIN: CPT | Mod: 95 | Performed by: FAMILY MEDICINE

## 2022-07-14 NOTE — PROGRESS NOTES
Maida is a 56 year old who is being evaluated via a billable video visit.      How would you like to obtain your AVS? MyChart  If the video visit is dropped, the invitation should be resent by: Text to cell phone: [phone]  Will anyone else be joining your video visit? No          Assessment & Plan     Cough    - nirmatrelvir and ritonavir (PAXLOVID) therapy pack; Take 3 tablets by mouth 2 times daily for 5 days Take 2 Nirmatrelvir tablets and 1 Ritonavir tablet twice daily for 5 days.    Fever, unspecified fever cause    - nirmatrelvir and ritonavir (PAXLOVID) therapy pack; Take 3 tablets by mouth 2 times daily for 5 days Take 2 Nirmatrelvir tablets and 1 Ritonavir tablet twice daily for 5 days.    Infection due to 2019 novel coronavirus  Discussed with the patient about her symptoms she is still in the window where she can get some benefit from antiviral medication.  We will start her on that medication advised symptomatic care.  As her second positive in the last 1 month.  This time her symptoms are more worse than last time.  - nirmatrelvir and ritonavir (PAXLOVID) therapy pack; Take 3 tablets by mouth 2 times daily for 5 days Take 2 Nirmatrelvir tablets and 1 Ritonavir tablet twice daily for 5 days.             No follow-ups on file.    Aryan Lira MD  [facility]    Flex Bey is a 56 year old, presenting for the following health issues:  No chief complaint on file.      HPI       COVID-19 Symptom Review  How many days ago did these symptoms start? 7/11/2022  Test positive on 712.  1 month ago she has similar symptom when she was positive at that time as well she did not take any medications.  Currently she has cough congestion and feeling like sinus pressure.  Are any of the following symptoms significant for you?  New or worsening difficulty breathing? Yes    Please describe what kind of difficulty you are having breathing:    Worsening cough? Yes, I am coughing up  mucus.    Fever or chills? No    Headache: No    Sore throat: No    Chest pain: No    Diarrhea: No    Body aches? No    What treatments has patient tried? Cough syrup   Does patient live in a nursing home, group home, or shelter? No  Does patient have a way to get food/medications during quarantined? Yes, I have a friend or family member who can help me.              Review of Systems   Constitutional, HEENT, cardiovascular, pulmonary, gi and gu systems are negative, except as otherwise noted.      Objective           Vitals:  No vitals were obtained today due to virtual visit.    Physical Exam   GENERAL: Healthy, alert and no distress  EYES: Eyes grossly normal to inspection.  No discharge or erythema, or obvious scleral/conjunctival abnormalities.  RESP: No audible wheeze, cough, or visible cyanosis.  No visible retractions or increased work of breathing.    SKIN: Visible skin clear. No significant rash, abnormal pigmentation or lesions.  NEURO: Cranial nerves grossly intact.  Mentation and speech appropriate for age.  PSYCH: Mentation appears normal, affect normal/bright, judgement and insight intact, normal speech and appearance well-groomed.            Video-Visit Details    Video Start Time: 10:45    Type of service:  Video Visit    Video End Time:11:03 AM    Originating Location (pt. Location): Home    Distant Location (provider location):  [facility]     Platform used for Video Visit: [employer]    Kimberli Rodriguez

## 2022-09-24 ENCOUNTER — HEALTH MAINTENANCE LETTER (OUTPATIENT)
Age: 57
End: 2022-09-24

## 2022-10-04 PROBLEM — M75.02 ADHESIVE CAPSULITIS OF LEFT SHOULDER: Status: RESOLVED | Noted: 2022-02-23 | Resolved: 2022-10-04

## 2022-10-04 NOTE — PROGRESS NOTES
Patient has been discharged from physical therapy. Please refer to last progress or SOAP note for last known status.

## 2023-01-29 ENCOUNTER — HEALTH MAINTENANCE LETTER (OUTPATIENT)
Age: 58
End: 2023-01-29

## 2023-03-22 ENCOUNTER — TELEPHONE (OUTPATIENT)
Dept: FAMILY MEDICINE | Facility: CLINIC | Age: 58
End: 2023-03-22

## 2023-03-22 NOTE — TELEPHONE ENCOUNTER
Patient Quality Outreach    Patient is due for the following:   Breast Cancer Screening - Mammogram  Physical Preventive Adult Physical    Next Steps:   Patient was assigned appropriate questionnaire to complete    Type of outreach:    Sent Watch Over Me message.      Questions for provider review:    None     Janes Burrell MA

## 2023-05-15 ENCOUNTER — OFFICE VISIT (OUTPATIENT)
Dept: RHEUMATOLOGY | Facility: CLINIC | Age: 58
End: 2023-05-15
Attending: STUDENT IN AN ORGANIZED HEALTH CARE EDUCATION/TRAINING PROGRAM
Payer: COMMERCIAL

## 2023-05-15 VITALS
DIASTOLIC BLOOD PRESSURE: 77 MMHG | SYSTOLIC BLOOD PRESSURE: 111 MMHG | HEART RATE: 70 BPM | WEIGHT: 143.2 LBS | BODY MASS INDEX: 23.86 KG/M2 | HEIGHT: 65 IN | OXYGEN SATURATION: 93 %

## 2023-05-15 DIAGNOSIS — M34.1 CREST SYNDROME (H): Primary | ICD-10-CM

## 2023-05-15 DIAGNOSIS — R05.3 CHRONIC COUGH: ICD-10-CM

## 2023-05-15 DIAGNOSIS — M06.09 SERONEGATIVE RHEUMATOID ARTHRITIS OF MULTIPLE SITES (H): ICD-10-CM

## 2023-05-15 DIAGNOSIS — R06.09 DOE (DYSPNEA ON EXERTION): ICD-10-CM

## 2023-05-15 PROCEDURE — 99417 PROLNG OP E/M EACH 15 MIN: CPT | Performed by: STUDENT IN AN ORGANIZED HEALTH CARE EDUCATION/TRAINING PROGRAM

## 2023-05-15 PROCEDURE — 99213 OFFICE O/P EST LOW 20 MIN: CPT | Performed by: STUDENT IN AN ORGANIZED HEALTH CARE EDUCATION/TRAINING PROGRAM

## 2023-05-15 PROCEDURE — 99215 OFFICE O/P EST HI 40 MIN: CPT | Performed by: STUDENT IN AN ORGANIZED HEALTH CARE EDUCATION/TRAINING PROGRAM

## 2023-05-15 ASSESSMENT — PAIN SCALES - GENERAL: PAINLEVEL: NO PAIN (0)

## 2023-05-15 NOTE — NURSING NOTE
"Chief Complaint   Patient presents with     RECHECK     +DANIELLE joint Pain     /77   Pulse 70   Ht 1.651 m (5' 5\")   Wt 65 kg (143 lb 3.2 oz)   SpO2 93%   BMI 23.83 kg/m        Daniel Colin MA  "

## 2023-05-15 NOTE — PATIENT INSTRUCTIONS
We are repeating your Pulmonary function testing and getting a speech and swallow study given your dilated esophagus.    If your shortness of breath going uphill isn't better in a month, please let me know and we will repeat your echo.

## 2023-05-15 NOTE — PROGRESS NOTES
[facility] Rheumatology  Today's visit: 05/15/23   Last seen:  06/06/22  ==================================================  CC: joint swelling, limited cutaneous systemic sclerosis (CREST)    SUBJECTIVE: Maida had covid in June, tested positive again in July, then had RSV in December 2022. She stopped the high dose tumeric because it was worsening her acid. Her cough has been relatively better lately with dietary changes but still there. She feels she had the most benefit for her arthritis with collagen supplements and has been able to reduce how much she takes to 1 scoop per day.  She recently became physically active again, has started golfing, and has noticed that she feels winded when going uphill.    ROS: 10 point ROS completed and negative except as noted per HPI  ALLERGIES: doxycycline, sulfa drugs  MEDS: collagen, prebiotic, postbiotic  PM/SHx: allergies, HLD, varicose vein stripping, sinus surgery, hysterectomy  FAMHx: father had PMR that cause bilateral hand pain, both parents had cardiovascular disease    PHYSICAL EXAM  No vitals today  General: NAD, pleasant  HEENT: face symmetric, no telangiectasias, even facial expression, no obvious restriction of oral aperture  Resp: able to complete long sentences without dyspnea or pause  Skin: no rashes of face, upper arms, neck, chest  MSK: no swelling of left PIP or wrist with full ROM of both demonstrated, no synovitis of any hand joints  Psych: congruent mood and affect    MR left wrist without contrast 5/7/2020 2:23 PM                                                            1. Suspect complete tear of dorsal component of scapholunate interosseous ligament and degenerative tear of membranous and volar components.   2. Extensive marrow edema, joint effusion with internal debris, likely synovitis. Constellation of findings are most concerning for underlying inflammatory arthritis. Alternatively reflecting multiple bone contusions though extent seems atypical  for contusion.  3. Suspected tearing of the distal lamina of the triangular ligament.    XRs of wrists, hands, feet from 10/29/2020 personally reviewed and reviewed with patient        Latest Ref Rng & Units 5/28/2020    12:44 PM 10/13/2020    12:55 PM 2/17/2022     8:27 AM   RHEUM RESULTS   Albumin 3.4 - 5.0 g/dL   3.8     DANIELLE interpretation NEG^Negative Positive       DANIELLE pattern 1  CENTROMERE       DANIELLE titer 1  1:1,280       Creatinine 0.52 - 1.04 mg/dL   0.62     CRP Inflammation 0.0 - 8.0 mg/L 7.0   4.5      GFR Estimate >60 mL/min/1.73m2   >90     Hematocrit 35.0 - 47.0 % 40.8       Hemoglobin 11.7 - 15.7 g/dL 12.6       WBC 4.0 - 11.0 10e9/L 8.9       RBC Count 3.8 - 5.2 10e12/L 4.20       RDW 10.0 - 15.0 % 13.5       MCHC 31.5 - 36.5 g/dL 30.9       MCV 78 - 100 fl 97       Platelet Count 150 - 450 10e9/L 255       Rheumatoid Factor <12 IU/mL <7       Sed Rate 0 - 30 mm/h 45         Lab Results   Component Value Date/Time    JEFF Positive (A) 05/28/2020 12:44 PM    ANAP1 CENTROMERE 05/28/2020 12:44 PM    ANAT1 1:1,280 05/28/2020 12:44 PM    RHF <7 05/28/2020 12:44 PM    CCPIGG 1 05/28/2020 12:44 PM     10/19 Left wrist aspiration with negative cultures, PMNs on gram stain, no crystals  10/19 Left wrist cultures with no growth, no anaerobes    Infectious screens:  Lyme negative  No quantiferon, hepatitis, hiv screens to date    6/17/22 PFT (done shortly before she was diagnosed with COVID)  The FEV1 and FEV1/FEV6 ratio are reduced.  The inspiratory flow rates are within normal limits.  While the TLC, FRC and SVC are within normal limits, the RV is increased.  Following administration of bronchodilators, there is no significant response. The diffusing capacity is normal.  However, the diffusing capacity was not corrected for the patient's hemoglobin. Mild airway obstruction is present.     6/13/22 HRCT  1.  No acute airspace disease or effusions.  2.  Mildly distended esophagus. Given the history of crest  syndrome,  this could relate to esophageal dysmotility and further assessment is Suggested.    7/7/22 Echo  Left ventricular size, wall motion and function are normal. The ejection fraction is 60-65%. Global right ventricular function is normal. Mild aortic insufficiency. Pulmonary artery systolic pressure is normal. The inferior vena cava is normal. No pericardial effusion. There is no prior study for direct comparison.    ASSESSMENT:   58 yo F with DANIELLE+ centromere+ LcSCC (CREST).     ## seronegative inflammatory arthritis, resolved  Resolved left wrist pain/swelling >6 months who subsequently developed swelling of left 2nd finger PIP of >1 month. Had small erosions on imaging. We did a repeat XR screen of her joints which did not show progressive erosive disease. Since that time she has had significant improvement in function without immunomodulatory therapy. She can continue to use naproxen as needed.    ## limited systemic sclerosis (CREST) w/raynauds, periungal capillary dropout, esophageal distention  ## chronic cough, SAEED  CREST with little if any permanent skin changes. Given chronic cough, will get swallow study and speech evaluation. Will repeat PFTs now as her prior was about a week before she was formally diagnosed with COVID. If PFTs unremarkable and SAEED when going uphill remains persistent, would repeat echo to evaluate for pulmonary hypertension again. She will let me know how she is doing from the shortness of breath perspective in about a month.    RTC 1 year, sooner pending above study results    Ramin Ngo MD, PhD  Rheumatology    69 minutes spent on the date of encounter doing chart review, history and exam, documentation, care coordination, and further activities as noted above

## 2023-08-14 ENCOUNTER — MYC MEDICAL ADVICE (OUTPATIENT)
Dept: RHEUMATOLOGY | Facility: CLINIC | Age: 58
End: 2023-08-14
Payer: COMMERCIAL

## 2023-08-23 NOTE — TELEPHONE ENCOUNTER
Spoke to Pulmonology who updates patient can call the scheduling line to get an appointment for the PFT.  The referral is current in patient's chart.    MyC message update to patient with contact info for scheduling.    Janae Denis RN  Rheumatology Clinic

## 2023-09-18 ENCOUNTER — TELEPHONE (OUTPATIENT)
Dept: OTOLARYNGOLOGY | Facility: CLINIC | Age: 58
End: 2023-09-18
Payer: COMMERCIAL

## 2023-09-18 NOTE — TELEPHONE ENCOUNTER
[facility] Call Center    Phone Message    May a detailed message be left on voicemail: yes     Reason for Call: Other: Pt has a swallow test for tomorrow, she's wondering if any other location does this that is not a hospital based clinic, if not then she will keep her apt for tomorrow, thanks    Action Taken: Other: ENT    Travel Screening: Not Applicable

## 2023-09-18 NOTE — TELEPHONE ENCOUNTER
Called patient back and let her know that typically the providers like to have the [facility] here because the swallow therapists are there and can help with the exam. Patient verbalized understanding of the situation and will have test done at [facility]. Binta Ramos RN on 9/18/2023 at 9:40 AM

## 2023-09-19 ENCOUNTER — THERAPY VISIT (OUTPATIENT)
Dept: SPEECH THERAPY | Facility: CLINIC | Age: 58
End: 2023-09-19
Attending: STUDENT IN AN ORGANIZED HEALTH CARE EDUCATION/TRAINING PROGRAM
Payer: COMMERCIAL

## 2023-09-19 ENCOUNTER — ANCILLARY PROCEDURE (OUTPATIENT)
Dept: GENERAL RADIOLOGY | Facility: CLINIC | Age: 58
End: 2023-09-19
Attending: STUDENT IN AN ORGANIZED HEALTH CARE EDUCATION/TRAINING PROGRAM
Payer: COMMERCIAL

## 2023-09-19 DIAGNOSIS — M34.1 CREST SYNDROME (H): ICD-10-CM

## 2023-09-19 PROCEDURE — 92611 MOTION FLUOROSCOPY/SWALLOW: CPT | Mod: GN | Performed by: SPEECH-LANGUAGE PATHOLOGIST

## 2023-09-19 PROCEDURE — 74230 X-RAY XM SWLNG FUNCJ C+: CPT | Mod: GC | Performed by: RADIOLOGY

## 2023-09-19 PROCEDURE — 92610 EVALUATE SWALLOWING FUNCTION: CPT | Mod: GN | Performed by: SPEECH-LANGUAGE PATHOLOGIST

## 2023-09-19 RX ORDER — BARIUM SULFATE 400 MG/ML
SUSPENSION ORAL ONCE
Status: COMPLETED | OUTPATIENT
Start: 2023-09-19 | End: 2023-09-19

## 2023-09-19 RX ADMIN — BARIUM SULFATE: 400 SUSPENSION ORAL at 09:10

## 2023-09-19 NOTE — PROGRESS NOTES
"SPEECH LANGUAGE PATHOLOGY EVALUATION    Subjective      Presenting condition or subjective complaint: Pt presents today for video swallow study evaluation given chronic cough.  Date of onset:  (Patient reports 1-2 years of coughing)    Relevant medical history: CREST syndrome, chronic cough    Prior diagnostic imaging/testing results:   6/13/22 HRCT  1.  No acute airspace disease or effusions.  2.  Mildly distended esophagus. Given the history of crest syndrome,  this could relate to esophageal dysmotility and further assessment is Suggested.    Living Environment  Hobbies/Interests: Pt reports she likes to gol[employer]Gist    Patient goals for therapy: To figure out what is causing her cough    Pain assessment: Pain denied     Objective     SWALLOW EVALUTION  Dysphagia history: Today, pt reports chronic cough that waxes and wanes for over a year or so. Reports coughing is worse at meals when eating/drinking and in the morning. She feels drainage in her throat. Endorses that coughing gets worse with talking. Reports she tried a reflux medication and thinks this made the cough worse. She endorses frequent reflux symptoms. Denies feeling of liquid or food going \"down the wrong tube.\" Occasionally feels like food gets stuck if she doesn't chew it well enough. Denies odynophagia.     Current Diet/Method of Nutritional Intake: thin liquids (level 0), regular diet        CLINICAL SWALLOW EVALUATION  Oral Motor Function: Dentition: natural dentition  Secretion management: WFL  Mucosal quality: adequate  Mandibular function: intact  Oral labial function: WFL  Lingual function: WFL  Velar function: intact   Buccal function: intact  Laryngeal function: cough, volitional swallow, voicing WFL   *Frequent cough at baseline    Level of assist required for feeding: no assistance needed   Textures Trialed:   Clinical Swallow Eval: Thin Liquids  Mode of presentation: cup, self-fed   Volume presented: 3oz  Preparatory Phase: WFL  Oral Phase: " WFL  Pharyngeal phase of swallow: intact   Diagnostic statement: No clinical s/sx of penetration/aspiration.      ADDITIONAL EVAL COMPLETED TODAY : yes; rationale: to further assess pharyngeal phase    VIDEOFLUOROSCOPIC SWALLOW STUDY  Radiologist: Resident  Views Taken: left lateral, A/P   Physical location of procedure: [facility]  Patient sitting upright on chair/stool     VFSS textures trialed:   VFSS Eval: Thin Liquids  Mode of Presentation: cup, self-fed   Order of Presentation: Series 2, 3, 9, 10, 12AP  Preparatory Phase: WFL  Oral Phase: WFL  Bolus Location When Swallow Initiated: posterior angle of ramus  Pharyngeal Phase: WFL  Rosenbeck's Penetration Aspiration Scale: 2 - contrast enters airway, remains above the vocal cords, no residue remains (penetration)  Diagnostic Statement: Intermittent penetration with no residuals remaining in laryngeal vestibule. No aspiration. AP reveals symmetric bolus flow through oropharynx.    VFSS Eval: Mildly Thick Liquids  Mode of Presentation: cup, self-fed   Order of Presentation: Series 4  Preparatory Phase: WFL  Oral Phase: WFL  Bolus Location When Swallow Initiated: posterior angle of ramus  Pharyngeal Phase: WFL  Rosenbeck's Penetration Aspiration Scale: 1 - no aspiration, contrast does not enter airway  Diagnostic Statement: No penetration/aspiration or significant residuals.    VFSS Eval: Purees  Mode of Presentation: spoon, self-fed   Order of Presentation: Series 5, 6, 11AP  Preparatory Phase: WFL  Oral Phase: premature pharyngeal entry  Bolus Location When Swallow Initiated: valleculae  Pharyngeal Phase: residue in valleculae; x1 minimal  Rosenbeck s Penetration Aspiration Scale: 1 - no aspiration, contrast does not enter airway  Diagnostic Statement: No penetration/aspiration. X1 minimal vallecular residuals. AP reveals symmetric bolus flow through oropharynx.    VFSS Eval: Solids  Mode of Presentation: self-fed   Order of Presentation: Series 7,  8  Preparatory Phase: WFL  Oral Phase: WFL  Bolus Location When Swallow Initiated: posterior angle of ramus  Pharyngeal Phase: WFL   Rosenbeck s Penetration Aspiration Scale: 1 - no aspiration, contrast does not enter airway  Diagnostic Statement: No penetration/aspiration or significant residuals.    VFSS Eval: Barium Tablet  Mode of Presentation:  whole tablet taken with thin water by cup    Order of Presentation: Series 10  Preparatory Phase: WFL  Oral Phase: WFL  Bolus Location When Swallow Initiated: posterior angle of ramus  Pharyngeal Phase: WFL  Rosenbeck s Penetration Aspiration Scale: 1 - no aspiration, contrast does not enter airway  Diagnostic Statement: Barium tablet passed through oropharynx without difficulty.     ESOPHAGEAL PHASE OF SWALLOW  patient reports symptoms of esophageal dysphagia  patient presents with symptoms of esophageal dysphagia  esophageal sweep performed during today's videofluoroscopic exam  please refer to radiologist's report for details     SWALLOW ASSESSMENT CLINICAL IMPRESSIONS AND RATIONALE  Diet Consistency Recommendations: thin liquids (level 0), regular diet    Recommended Feeding/Eating Techniques: maintain upright sitting position for eating, maintain upright posture during/after eating for 30 minutes   Medication Administration Recommendations: as tolerated    Assessment & Plan   CLINICAL IMPRESSIONS   Medical Diagnosis: CREST Syndrome    Treatment Diagnosis: Safe, functional oropharyngeal swallow response   Impression/Assessment:  Pt presents today with a safe, functional oropharyngeal swallow response. No oropharyngeal dysphagia identified.     PLAN OF CARE  Evaluation only    Recommended Referrals to Other Professionals:  GI-esophageal, could consider Laryngology referral, as well  Education Assessment:   Learner/Method: Patient    Risks and benefits of evaluation/treatment have been explained.   Patient/Family/caregiver agrees with Plan of Care.     Evaluation Time:     SLP Eval: oral/pharyngeal swallow function, clinical minutes (08950): 15  SLP Eval: VideoFluoroscopic Swallow function Minutes (49036): 23    Signing Clinician: LYNNE Uriostegui

## 2023-09-19 NOTE — Clinical Note
No oropharyngeal dysphagia. Could consider laryngology consult for chronic cough and/or esophageal GI (Julian Davis or MAIKEL Hugo). Thanks!

## 2023-10-11 ENCOUNTER — OFFICE VISIT (OUTPATIENT)
Dept: PULMONOLOGY | Facility: CLINIC | Age: 58
End: 2023-10-11
Attending: STUDENT IN AN ORGANIZED HEALTH CARE EDUCATION/TRAINING PROGRAM
Payer: COMMERCIAL

## 2023-10-11 DIAGNOSIS — M34.1 CREST SYNDROME (H): ICD-10-CM

## 2023-10-11 LAB — HGB BLD-MCNC: 13.7 G/DL

## 2023-10-11 PROCEDURE — 85018 HEMOGLOBIN: CPT | Mod: QW | Performed by: INTERNAL MEDICINE

## 2023-10-11 PROCEDURE — 94729 DIFFUSING CAPACITY: CPT | Performed by: INTERNAL MEDICINE

## 2023-10-11 PROCEDURE — 94060 EVALUATION OF WHEEZING: CPT | Performed by: INTERNAL MEDICINE

## 2023-10-11 PROCEDURE — 94726 PLETHYSMOGRAPHY LUNG VOLUMES: CPT | Performed by: INTERNAL MEDICINE

## 2023-10-12 LAB
DLCOCOR-%PRED-PRE: 79 %
DLCOCOR-PRE: 16.02 ML/MIN/MMHG
DLCOUNC-%PRED-PRE: 79 %
DLCOUNC-PRE: 16.17 ML/MIN/MMHG
DLCOUNC-PRED: 20.28 ML/MIN/MMHG
ERV-%PRED-PRE: 39 %
ERV-PRE: 0.45 L
ERV-PRED: 1.13 L
EXPTIME-PRE: 7.16 SEC
FEF2575-%PRED-POST: 108 %
FEF2575-%PRED-PRE: 63 %
FEF2575-POST: 2.4 L/SEC
FEF2575-PRE: 1.41 L/SEC
FEF2575-PRED: 2.21 L/SEC
FEFMAX-%PRED-PRE: 80 %
FEFMAX-PRE: 5.17 L/SEC
FEFMAX-PRED: 6.46 L/SEC
FEV1-%PRED-PRE: 78 %
FEV1-PRE: 1.89 L
FEV1FEV6-PRE: 74 %
FEV1FEV6-PRED: 81 %
FEV1FVC-PRE: 74 %
FEV1FVC-PRED: 80 %
FEV1SVC-PRE: 68 %
FEV1SVC-PRED: 72 %
FIFMAX-PRE: 3.45 L/SEC
FRCPLETH-%PRED-PRE: 120 %
FRCPLETH-PRE: 3.31 L
FRCPLETH-PRED: 2.76 L
FVC-%PRED-PRE: 84 %
FVC-PRE: 2.57 L
FVC-PRED: 3.02 L
IC-%PRED-PRE: 102 %
IC-PRE: 2.31 L
IC-PRED: 2.27 L
RVPLETH-%PRED-PRE: 147 %
RVPLETH-PRE: 2.86 L
RVPLETH-PRED: 1.95 L
TLCPLETH-%PRED-PRE: 110 %
TLCPLETH-PRE: 5.62 L
TLCPLETH-PRED: 5.11 L
VA-%PRED-PRE: 95 %
VA-PRE: 4.61 L
VC-%PRED-PRE: 82 %
VC-PRE: 2.76 L
VC-PRED: 3.35 L

## 2023-10-13 ENCOUNTER — MYC MEDICAL ADVICE (OUTPATIENT)
Dept: RHEUMATOLOGY | Facility: CLINIC | Age: 58
End: 2023-10-13
Payer: COMMERCIAL

## 2023-10-19 NOTE — TELEPHONE ENCOUNTER
Brief Telephone Call  10/19/23    I spoke with Maida about her PFT results. It seems that she has albuterol sensitivities bronchial hyper reactivity. Maida was undergoing these evaluations to determine why she had a chronic cough. Cough variant asthma could be a diagnosis that explains both her cough and bronchial hyper reactivity however it is not a condition I treat. Maida is going to find a new primary care and discuss the diagnosis and potential treatments with them. For now, we will hold off on GI referral as many of her symptoms could be related to her bronchial hyper reactivity and it would be good to get that under control and see if any GI symptoms remain to be evaluated.    Ramin Ngo MD, PhD  Rheumatology

## 2024-02-25 ENCOUNTER — HEALTH MAINTENANCE LETTER (OUTPATIENT)
Age: 59
End: 2024-02-25

## 2024-03-04 NOTE — LETTER
5/15/2023       RE: Maida Menendez  [id number]  [address]  [place] [address]     Dear Colleague,    Thank you for referring your patient, Maida Menendez, to the [facility] RHEUMATOLOGY at [facility]. Please see a copy of my visit note below.    [facility] Rheumatology  Today's visit: 05/15/23   Last seen:  06/06/22  ==================================================  CC: joint swelling, limited cutaneous systemic sclerosis (CREST)    SUBJECTIVE: Maida had covid in June, tested positive again in July, then had RSV in December 2022. She stopped the high dose tumeric because it was worsening her acid. Her cough has been relatively better lately with dietary changes but still there. She feels she had the most benefit for her arthritis with collagen supplements and has been able to reduce how much she takes to 1 scoop per day.  She recently became physically active again, has started golfing, and has noticed that she feels winded when going uphill.    ROS: 10 point ROS completed and negative except as noted per HPI  ALLERGIES: doxycycline, sulfa drugs  MEDS: collagen, prebiotic, postbiotic  PM/SHx: allergies, HLD, varicose vein stripping, sinus surgery, hysterectomy  FAMHx: father had PMR that cause bilateral hand pain, both parents had cardiovascular disease    PHYSICAL EXAM  No vitals today  General: NAD, pleasant  HEENT: face symmetric, no telangiectasias, even facial expression, no obvious restriction of oral aperture  Resp: able to complete long sentences without dyspnea or pause  Skin: no rashes of face, upper arms, neck, chest  MSK: no swelling of left PIP or wrist with full ROM of both demonstrated, no synovitis of any hand joints  Psych: congruent mood and affect    MR left wrist without contrast 5/7/2020 2:23 PM                                                            1. Suspect complete tear of dorsal component of scapholunate interosseous ligament and  degenerative tear of membranous and volar components.   2. Extensive marrow edema, joint effusion with internal debris, likely synovitis. Constellation of findings are most concerning for underlying inflammatory arthritis. Alternatively reflecting multiple bone contusions though extent seems atypical for contusion.  3. Suspected tearing of the distal lamina of the triangular ligament.    XRs of wrists, hands, feet from 10/29/2020 personally reviewed and reviewed with patient        Latest Ref Rng & Units 5/28/2020    12:44 PM 10/13/2020    12:55 PM 2/17/2022     8:27 AM   RHEUM RESULTS   Albumin 3.4 - 5.0 g/dL   3.8     DANIELLE interpretation NEG^Negative Positive       DANIELLE pattern 1  CENTROMERE       DANIELLE titer 1  1:1,280       Creatinine 0.52 - 1.04 mg/dL   0.62     CRP Inflammation 0.0 - 8.0 mg/L 7.0   4.5      GFR Estimate >60 mL/min/1.73m2   >90     Hematocrit 35.0 - 47.0 % 40.8       Hemoglobin 11.7 - 15.7 g/dL 12.6       WBC 4.0 - 11.0 10e9/L 8.9       RBC Count 3.8 - 5.2 10e12/L 4.20       RDW 10.0 - 15.0 % 13.5       MCHC 31.5 - 36.5 g/dL 30.9       MCV 78 - 100 fl 97       Platelet Count 150 - 450 10e9/L 255       Rheumatoid Factor <12 IU/mL <7       Sed Rate 0 - 30 mm/h 45         Lab Results   Component Value Date/Time    JEFF Positive (A) 05/28/2020 12:44 PM    ANAP1 CENTROMERE 05/28/2020 12:44 PM    ANAT1 1:1,280 05/28/2020 12:44 PM    RHF <7 05/28/2020 12:44 PM    CCPIGG 1 05/28/2020 12:44 PM     10/19 Left wrist aspiration with negative cultures, PMNs on gram stain, no crystals  10/19 Left wrist cultures with no growth, no anaerobes    Infectious screens:  Lyme negative  No quantiferon, hepatitis, hiv screens to date    6/17/22 PFT (done shortly before she was diagnosed with COVID)  The FEV1 and FEV1/FEV6 ratio are reduced.  The inspiratory flow rates are within normal limits.  While the TLC, FRC and SVC are within normal limits, the RV is increased.  Following administration of bronchodilators, there is  no significant response. The diffusing capacity is normal.  However, the diffusing capacity was not corrected for the patient's hemoglobin. Mild airway obstruction is present.     6/13/22 HRCT  1.  No acute airspace disease or effusions.  2.  Mildly distended esophagus. Given the history of crest syndrome,  this could relate to esophageal dysmotility and further assessment is Suggested.    7/7/22 Echo  Left ventricular size, wall motion and function are normal. The ejection fraction is 60-65%. Global right ventricular function is normal. Mild aortic insufficiency. Pulmonary artery systolic pressure is normal. The inferior vena cava is normal. No pericardial effusion. There is no prior study for direct comparison.    ASSESSMENT:   56 yo F with DANIELLE+ centromere+ LcSCC (CREST).     ## seronegative inflammatory arthritis, resolved  Resolved left wrist pain/swelling >6 months who subsequently developed swelling of left 2nd finger PIP of >1 month. Had small erosions on imaging. We did a repeat XR screen of her joints which did not show progressive erosive disease. Since that time she has had significant improvement in function without immunomodulatory therapy. She can continue to use naproxen as needed.    ## limited systemic sclerosis (CREST) w/raynauds, periungal capillary dropout, esophageal distention  ## chronic cough, SAEED  CREST with little if any permanent skin changes. Given chronic cough, will get swallow study and speech evaluation. Will repeat PFTs now as her prior was about a week before she was formally diagnosed with COVID. If PFTs unremarkable and SAEED when going uphill remains persistent, would repeat echo to evaluate for pulmonary hypertension again. She will let me know how she is doing from the shortness of breath perspective in about a month.    RTC 1 year, sooner pending above study results    Ramin Ngo MD, PhD  Rheumatology    69 minutes spent on the date of encounter doing chart review,  history and exam, documentation, care coordination, and further activities as noted above     pain, neck/pain, back

## 2024-04-17 ENCOUNTER — LAB REQUISITION (OUTPATIENT)
Dept: LAB | Facility: CLINIC | Age: 59
End: 2024-04-17
Payer: COMMERCIAL

## 2024-04-17 DIAGNOSIS — L65.9 NONSCARRING HAIR LOSS, UNSPECIFIED: ICD-10-CM

## 2024-04-17 PROCEDURE — 88305 TISSUE EXAM BY PATHOLOGIST: CPT | Mod: 26 | Performed by: DERMATOLOGY

## 2024-04-17 PROCEDURE — 88305 TISSUE EXAM BY PATHOLOGIST: CPT | Mod: TC,ORL | Performed by: DERMATOLOGY

## 2024-04-19 LAB
PATH REPORT.COMMENTS IMP SPEC: NORMAL
PATH REPORT.FINAL DX SPEC: NORMAL
PATH REPORT.GROSS SPEC: NORMAL
PATH REPORT.MICROSCOPIC SPEC OTHER STN: NORMAL
PATH REPORT.RELEVANT HX SPEC: NORMAL

## 2025-02-15 ENCOUNTER — HEALTH MAINTENANCE LETTER (OUTPATIENT)
Age: 60
End: 2025-02-15

## 2025-03-15 ENCOUNTER — HEALTH MAINTENANCE LETTER (OUTPATIENT)
Age: 60
End: 2025-03-15

## (undated) RX ORDER — BETAMETHASONE SODIUM PHOSPHATE AND BETAMETHASONE ACETATE 3; 3 MG/ML; MG/ML
INJECTION, SUSPENSION INTRA-ARTICULAR; INTRALESIONAL; INTRAMUSCULAR; SOFT TISSUE
Status: DISPENSED
Start: 2020-10-19

## (undated) RX ORDER — ALBUTEROL SULFATE 0.83 MG/ML
SOLUTION RESPIRATORY (INHALATION)
Status: DISPENSED
Start: 2022-06-17

## (undated) RX ORDER — LIDOCAINE HYDROCHLORIDE 10 MG/ML
INJECTION, SOLUTION EPIDURAL; INFILTRATION; INTRACAUDAL; PERINEURAL
Status: DISPENSED
Start: 2020-10-19